# Patient Record
Sex: FEMALE | Race: WHITE
[De-identification: names, ages, dates, MRNs, and addresses within clinical notes are randomized per-mention and may not be internally consistent; named-entity substitution may affect disease eponyms.]

---

## 2021-09-24 ENCOUNTER — TRANSCRIPTION ENCOUNTER (OUTPATIENT)
Age: 66
End: 2021-09-24

## 2022-01-10 ENCOUNTER — TRANSCRIPTION ENCOUNTER (OUTPATIENT)
Age: 67
End: 2022-01-10

## 2022-10-10 ENCOUNTER — APPOINTMENT (OUTPATIENT)
Dept: OBGYN | Facility: CLINIC | Age: 67
End: 2022-10-10

## 2022-10-10 VITALS
TEMPERATURE: 98.2 F | SYSTOLIC BLOOD PRESSURE: 130 MMHG | BODY MASS INDEX: 31.49 KG/M2 | HEIGHT: 65 IN | DIASTOLIC BLOOD PRESSURE: 72 MMHG | WEIGHT: 189 LBS

## 2022-10-10 DIAGNOSIS — Z13.820 ENCOUNTER FOR SCREENING FOR OSTEOPOROSIS: ICD-10-CM

## 2022-10-10 DIAGNOSIS — Z00.00 ENCOUNTER FOR GENERAL ADULT MEDICAL EXAMINATION W/OUT ABNORMAL FINDINGS: ICD-10-CM

## 2022-10-10 DIAGNOSIS — Z87.891 PERSONAL HISTORY OF NICOTINE DEPENDENCE: ICD-10-CM

## 2022-10-10 DIAGNOSIS — Z12.31 ENCOUNTER FOR SCREENING MAMMOGRAM FOR MALIGNANT NEOPLASM OF BREAST: ICD-10-CM

## 2022-10-10 PROCEDURE — G0101: CPT

## 2022-10-10 NOTE — PHYSICAL EXAM
[Appropriately responsive] : appropriately responsive [Alert] : alert [No Acute Distress] : no acute distress [No Lymphadenopathy] : no lymphadenopathy [Regular Rate Rhythm] : regular rate rhythm [No Murmurs] : no murmurs [Clear to Auscultation B/L] : clear to auscultation bilaterally [Non-tender] : non-tender [Soft] : soft [Non-distended] : non-distended [No HSM] : No HSM [No Lesions] : no lesions [No Mass] : no mass [Oriented x3] : oriented x3 [Examination Of The Breasts] : a normal appearance [Normal] : normal [No Masses] : no breast masses were palpable

## 2022-10-10 NOTE — HISTORY OF PRESENT ILLNESS
[FreeTextEntry1] : 68 yo here for  for exam, last exam 3 yrs ago.NO abnormal paps , no  VB. some vag dryness occ hot flash. \par Dad had coloncancer at 80 from metastatic colon caner., mammos.\par Pt has never been genetically tested , [Currently Active] : currently active [Men] : men [Vaginal] : vaginal [No] : No

## 2022-10-11 LAB — HPV HIGH+LOW RISK DNA PNL CVX: NOT DETECTED

## 2022-10-13 LAB — CYTOLOGY CVX/VAG DOC THIN PREP: ABNORMAL

## 2022-10-27 ENCOUNTER — TRANSCRIPTION ENCOUNTER (OUTPATIENT)
Age: 67
End: 2022-10-27

## 2022-12-12 ENCOUNTER — APPOINTMENT (OUTPATIENT)
Dept: OBGYN | Facility: CLINIC | Age: 67
End: 2022-12-12

## 2022-12-12 DIAGNOSIS — M81.0 AGE-RELATED OSTEOPOROSIS W/OUT CURRENT PATHOLOGICAL FRACTURE: ICD-10-CM

## 2022-12-12 PROCEDURE — 99212 OFFICE O/P EST SF 10 MIN: CPT | Mod: 95

## 2022-12-12 RX ORDER — RALOXIFENE HYDROCHLORIDE 60 MG/1
60 TABLET, FILM COATED ORAL DAILY
Qty: 90 | Refills: 3 | Status: ACTIVE | COMMUNITY
Start: 2022-12-12 | End: 1900-01-01

## 2022-12-12 NOTE — HISTORY OF PRESENT ILLNESS
[Other Location: e.g. School (Enter Location, City,State)___] : at [unfilled], at the time of the visit. [Medical Office: ([facility])___] : at the medical office located in  [Spouse] : spouse [Verbal consent obtained from patient] : the patient, [unfilled] [FreeTextEntry1] : fhx-no\par [place]-2001\par Breakfast-oats\par lunch-soup sandwich\par dinner- greens, meat, \par caffine-1day, etoh-wine 3 glass smoke-40 yrs\par exercise- minimal\par had kidney stones in 20s.\par take vit D\par osteoporosisi-spine and osteopenia  hips.\par

## 2022-12-12 NOTE — DISCUSSION/SUMMARY
[FreeTextEntry1] : disc options of different meds\par evista\par biphoshenate\par  pt would like to try the evista\par She will increase her walking

## 2023-06-30 ENCOUNTER — INPATIENT (INPATIENT)
Facility: HOSPITAL | Age: 68
LOS: 5 days | Discharge: HOME | DRG: 868 | End: 2023-07-06
Attending: FAMILY MEDICINE | Admitting: FAMILY MEDICINE
Payer: MEDICARE

## 2023-06-30 VITALS — WEIGHT: 184.97 LBS | HEIGHT: 65 IN

## 2023-06-30 DIAGNOSIS — R55 SYNCOPE AND COLLAPSE: ICD-10-CM

## 2023-06-30 LAB
ALBUMIN SERPL ELPH-MCNC: 3.3 G/DL — SIGNIFICANT CHANGE UP (ref 3.3–5)
ALP SERPL-CCNC: 121 U/L — HIGH (ref 40–120)
ALT FLD-CCNC: 40 U/L — SIGNIFICANT CHANGE UP (ref 12–78)
ANION GAP SERPL CALC-SCNC: 4 MMOL/L — LOW (ref 5–17)
APPEARANCE UR: CLEAR — SIGNIFICANT CHANGE UP
AST SERPL-CCNC: 40 U/L — HIGH (ref 15–37)
BACTERIA # UR AUTO: ABNORMAL
BASOPHILS # BLD AUTO: 0 K/UL — SIGNIFICANT CHANGE UP (ref 0–0.2)
BASOPHILS NFR BLD AUTO: 0 % — SIGNIFICANT CHANGE UP (ref 0–2)
BILIRUB SERPL-MCNC: 1.2 MG/DL — SIGNIFICANT CHANGE UP (ref 0.2–1.2)
BILIRUB UR-MCNC: NEGATIVE — SIGNIFICANT CHANGE UP
BUN SERPL-MCNC: 19 MG/DL — SIGNIFICANT CHANGE UP (ref 7–23)
CALCIUM SERPL-MCNC: 9.4 MG/DL — SIGNIFICANT CHANGE UP (ref 8.5–10.1)
CHLORIDE SERPL-SCNC: 106 MMOL/L — SIGNIFICANT CHANGE UP (ref 96–108)
CO2 SERPL-SCNC: 25 MMOL/L — SIGNIFICANT CHANGE UP (ref 22–31)
COLOR SPEC: YELLOW — SIGNIFICANT CHANGE UP
CREAT SERPL-MCNC: 1.17 MG/DL — SIGNIFICANT CHANGE UP (ref 0.5–1.3)
DIFF PNL FLD: ABNORMAL
EGFR: 51 ML/MIN/1.73M2 — LOW
EOSINOPHIL # BLD AUTO: 0 K/UL — SIGNIFICANT CHANGE UP (ref 0–0.5)
EOSINOPHIL NFR BLD AUTO: 0 % — SIGNIFICANT CHANGE UP (ref 0–6)
EPI CELLS # UR: ABNORMAL
GLUCOSE SERPL-MCNC: 148 MG/DL — HIGH (ref 70–99)
GLUCOSE UR QL: NEGATIVE — SIGNIFICANT CHANGE UP
HCT VFR BLD CALC: 37.5 % — SIGNIFICANT CHANGE UP (ref 34.5–45)
HGB BLD-MCNC: 12.5 G/DL — SIGNIFICANT CHANGE UP (ref 11.5–15.5)
KETONES UR-MCNC: NEGATIVE — SIGNIFICANT CHANGE UP
LACTATE SERPL-SCNC: 1.4 MMOL/L — SIGNIFICANT CHANGE UP (ref 0.7–2)
LEUKOCYTE ESTERASE UR-ACNC: ABNORMAL
LYMPHOCYTES # BLD AUTO: 1.83 K/UL — SIGNIFICANT CHANGE UP (ref 1–3.3)
LYMPHOCYTES # BLD AUTO: 39 % — SIGNIFICANT CHANGE UP (ref 13–44)
MANUAL SMEAR VERIFICATION: SIGNIFICANT CHANGE UP
MCHC RBC-ENTMCNC: 29.8 PG — SIGNIFICANT CHANGE UP (ref 27–34)
MCHC RBC-ENTMCNC: 33.3 GM/DL — SIGNIFICANT CHANGE UP (ref 32–36)
MCV RBC AUTO: 89.3 FL — SIGNIFICANT CHANGE UP (ref 80–100)
MONOCYTES # BLD AUTO: 0.75 K/UL — SIGNIFICANT CHANGE UP (ref 0–0.9)
MONOCYTES NFR BLD AUTO: 16 % — HIGH (ref 2–14)
NEUTROPHILS # BLD AUTO: 1.97 K/UL — SIGNIFICANT CHANGE UP (ref 1.8–7.4)
NEUTROPHILS NFR BLD AUTO: 42 % — LOW (ref 43–77)
NITRITE UR-MCNC: NEGATIVE — SIGNIFICANT CHANGE UP
NRBC # BLD: 0 /100 — SIGNIFICANT CHANGE UP (ref 0–0)
NRBC # BLD: SIGNIFICANT CHANGE UP /100 WBCS (ref 0–0)
PH UR: 6.5 — SIGNIFICANT CHANGE UP (ref 5–8)
PLAT MORPH BLD: NORMAL — SIGNIFICANT CHANGE UP
PLATELET # BLD AUTO: 119 K/UL — LOW (ref 150–400)
PLATELET COUNT - ESTIMATE: ABNORMAL
POTASSIUM SERPL-MCNC: 4 MMOL/L — SIGNIFICANT CHANGE UP (ref 3.5–5.3)
POTASSIUM SERPL-SCNC: 4 MMOL/L — SIGNIFICANT CHANGE UP (ref 3.5–5.3)
PROT SERPL-MCNC: 7.5 GM/DL — SIGNIFICANT CHANGE UP (ref 6–8.3)
PROT UR-MCNC: 30 MG/DL
RAPID RVP RESULT: SIGNIFICANT CHANGE UP
RBC # BLD: 4.2 M/UL — SIGNIFICANT CHANGE UP (ref 3.8–5.2)
RBC # FLD: 15.6 % — HIGH (ref 10.3–14.5)
RBC BLD AUTO: NORMAL — SIGNIFICANT CHANGE UP
RBC CASTS # UR COMP ASSIST: ABNORMAL /HPF (ref 0–4)
SARS-COV-2 RNA SPEC QL NAA+PROBE: SIGNIFICANT CHANGE UP
SODIUM SERPL-SCNC: 135 MMOL/L — SIGNIFICANT CHANGE UP (ref 135–145)
SP GR SPEC: 1 — LOW (ref 1.01–1.02)
TROPONIN I, HIGH SENSITIVITY RESULT: 3.77 NG/L — SIGNIFICANT CHANGE UP
TROPONIN I, HIGH SENSITIVITY RESULT: 4.74 NG/L — SIGNIFICANT CHANGE UP
UROBILINOGEN FLD QL: 1
VARIANT LYMPHS # BLD: 3 % — SIGNIFICANT CHANGE UP (ref 0–6)
WBC # BLD: 4.68 K/UL — SIGNIFICANT CHANGE UP (ref 3.8–10.5)
WBC # FLD AUTO: 4.68 K/UL — SIGNIFICANT CHANGE UP (ref 3.8–10.5)
WBC UR QL: ABNORMAL /HPF (ref 0–5)

## 2023-06-30 PROCEDURE — 93005 ELECTROCARDIOGRAM TRACING: CPT

## 2023-06-30 PROCEDURE — 71275 CT ANGIOGRAPHY CHEST: CPT

## 2023-06-30 PROCEDURE — 86664 EPSTEIN-BARR NUCLEAR ANTIGEN: CPT

## 2023-06-30 PROCEDURE — 82607 VITAMIN B-12: CPT

## 2023-06-30 PROCEDURE — 85027 COMPLETE CBC AUTOMATED: CPT

## 2023-06-30 PROCEDURE — 85610 PROTHROMBIN TIME: CPT

## 2023-06-30 PROCEDURE — 86803 HEPATITIS C AB TEST: CPT

## 2023-06-30 PROCEDURE — 86308 HETEROPHILE ANTIBODY SCREEN: CPT

## 2023-06-30 PROCEDURE — 86645 CMV ANTIBODY IGM: CPT

## 2023-06-30 PROCEDURE — 85025 COMPLETE CBC W/AUTO DIFF WBC: CPT

## 2023-06-30 PROCEDURE — 87207 SMEAR SPECIAL STAIN: CPT

## 2023-06-30 PROCEDURE — 74177 CT ABD & PELVIS W/CONTRAST: CPT | Mod: 26,MA

## 2023-06-30 PROCEDURE — 36415 COLL VENOUS BLD VENIPUNCTURE: CPT

## 2023-06-30 PROCEDURE — 93306 TTE W/DOPPLER COMPLETE: CPT

## 2023-06-30 PROCEDURE — 85730 THROMBOPLASTIN TIME PARTIAL: CPT

## 2023-06-30 PROCEDURE — 80061 LIPID PANEL: CPT

## 2023-06-30 PROCEDURE — 86663 EPSTEIN-BARR ANTIBODY: CPT

## 2023-06-30 PROCEDURE — 86757 RICKETTSIA ANTIBODY: CPT

## 2023-06-30 PROCEDURE — 82746 ASSAY OF FOLIC ACID SERUM: CPT

## 2023-06-30 PROCEDURE — 82728 ASSAY OF FERRITIN: CPT

## 2023-06-30 PROCEDURE — 83540 ASSAY OF IRON: CPT

## 2023-06-30 PROCEDURE — 87468 ANAPLSMA PHGCYTOPHLM AMP PRB: CPT

## 2023-06-30 PROCEDURE — 93010 ELECTROCARDIOGRAM REPORT: CPT

## 2023-06-30 PROCEDURE — 85384 FIBRINOGEN ACTIVITY: CPT

## 2023-06-30 PROCEDURE — 71045 X-RAY EXAM CHEST 1 VIEW: CPT | Mod: 26

## 2023-06-30 PROCEDURE — 87798 DETECT AGENT NOS DNA AMP: CPT

## 2023-06-30 PROCEDURE — 85652 RBC SED RATE AUTOMATED: CPT

## 2023-06-30 PROCEDURE — 86617 LYME DISEASE ANTIBODY: CPT

## 2023-06-30 PROCEDURE — 86665 EPSTEIN-BARR CAPSID VCA: CPT

## 2023-06-30 PROCEDURE — 76700 US EXAM ABDOM COMPLETE: CPT

## 2023-06-30 PROCEDURE — 87040 BLOOD CULTURE FOR BACTERIA: CPT

## 2023-06-30 PROCEDURE — 80048 BASIC METABOLIC PNL TOTAL CA: CPT

## 2023-06-30 PROCEDURE — 87484 EHRLICHA CHAFFEENSIS AMP PRB: CPT

## 2023-06-30 PROCEDURE — 86618 LYME DISEASE ANTIBODY: CPT

## 2023-06-30 PROCEDURE — 86753 PROTOZOA ANTIBODY NOS: CPT

## 2023-06-30 PROCEDURE — 83605 ASSAY OF LACTIC ACID: CPT

## 2023-06-30 PROCEDURE — 83550 IRON BINDING TEST: CPT

## 2023-06-30 PROCEDURE — 84484 ASSAY OF TROPONIN QUANT: CPT

## 2023-06-30 PROCEDURE — 80053 COMPREHEN METABOLIC PANEL: CPT

## 2023-06-30 PROCEDURE — 86140 C-REACTIVE PROTEIN: CPT

## 2023-06-30 PROCEDURE — 86789 WEST NILE VIRUS ANTIBODY: CPT

## 2023-06-30 PROCEDURE — 83036 HEMOGLOBIN GLYCOSYLATED A1C: CPT

## 2023-06-30 PROCEDURE — 85379 FIBRIN DEGRADATION QUANT: CPT

## 2023-06-30 PROCEDURE — 99285 EMERGENCY DEPT VISIT HI MDM: CPT

## 2023-06-30 PROCEDURE — 83615 LACTATE (LD) (LDH) ENZYME: CPT

## 2023-06-30 PROCEDURE — 99222 1ST HOSP IP/OBS MODERATE 55: CPT

## 2023-06-30 PROCEDURE — 86644 CMV ANTIBODY: CPT

## 2023-06-30 PROCEDURE — 86666 EHRLICHIA ANTIBODY: CPT

## 2023-06-30 PROCEDURE — 86788 WEST NILE VIRUS AB IGM: CPT

## 2023-06-30 RX ORDER — SODIUM CHLORIDE 9 MG/ML
1000 INJECTION INTRAMUSCULAR; INTRAVENOUS; SUBCUTANEOUS ONCE
Refills: 0 | Status: COMPLETED | OUTPATIENT
Start: 2023-06-30 | End: 2023-06-30

## 2023-06-30 RX ORDER — RALOXIFENE HYDROCHLORIDE 60 MG/1
1 TABLET, COATED ORAL
Refills: 0 | DISCHARGE

## 2023-06-30 RX ORDER — TRAZODONE HCL 50 MG
0.5 TABLET ORAL
Refills: 0 | DISCHARGE

## 2023-06-30 RX ORDER — EZETIMIBE 10 MG/1
1 TABLET ORAL
Refills: 0 | DISCHARGE

## 2023-06-30 RX ORDER — ATORVASTATIN CALCIUM 80 MG/1
1 TABLET, FILM COATED ORAL
Refills: 0 | DISCHARGE

## 2023-06-30 RX ORDER — METOPROLOL TARTRATE 50 MG
1 TABLET ORAL
Refills: 0 | DISCHARGE

## 2023-06-30 RX ORDER — ACETAMINOPHEN 500 MG
650 TABLET ORAL ONCE
Refills: 0 | Status: COMPLETED | OUTPATIENT
Start: 2023-06-30 | End: 2023-07-01

## 2023-06-30 RX ADMIN — SODIUM CHLORIDE 1000 MILLILITER(S): 9 INJECTION INTRAMUSCULAR; INTRAVENOUS; SUBCUTANEOUS at 19:49

## 2023-06-30 RX ADMIN — SODIUM CHLORIDE 1000 MILLILITER(S): 9 INJECTION INTRAMUSCULAR; INTRAVENOUS; SUBCUTANEOUS at 17:46

## 2023-06-30 NOTE — ED STATDOCS - PHYSICAL EXAMINATION
Constitutional: NAD   Eyes: PERRLA  Head: Normocephalic   Mouth: MMM  Cardiac: regular rate   Resp: Lungs CTAB  GI: Abd s/nt/nd, no rebound or guarding.  Neuro: awake, alert, moving all extremities  Skin: No rashes

## 2023-06-30 NOTE — ED STATDOCS - CLINICAL SUMMARY MEDICAL DECISION MAKING FREE TEXT BOX
66 y/o female with near syncope, left lower abdominal pain, and weakness. Will obtain blood work, CT scan, UA, and r/o infectious vs. electrolyte abnormality.

## 2023-06-30 NOTE — PATIENT PROFILE ADULT - DO YOU FEEL LIKE HURTING YOURSELF OR OTHERS?
Clinic hours for Dr. Min:  Monday 7 am - 5 pm  Tuesday 7 am - 5 pm  Wednesday 7 am - 5 pm  Thursday 7 am - 5 pm  Friday  7 am - 4 pm    If you need a refill on your prescription please call your pharmacy and let them know. Please be proactive and call before your medication runs out. The pharmacy will then contact us for the refill. Please allow 24-48 hours for the refill to be processed.     If your physician has ordered additional laboratory or radiology testing as part of your ongoing plan of care, please allow 7-10 business days from the day of your lab draw or test for the results to be sent and reviewed by your provider. If your results are critical and require more immediate intervention, you will be contacted sooner. Your results will be conveyed to you via a phone call or letter.    You may be receiving a patient satisfaction survey in the mail. Please take the time to complete, as your feedback is very important to us. We strive to make your experience exceptional and your comments help us with that goal. We look forward to hearing from you.       no

## 2023-06-30 NOTE — ED STATDOCS - PROGRESS NOTE DETAILS
patient seen and evaluated.  Reviewed all results with patient, given hepatosplenomegaly, suspect patient possibly has a viral syndrome but given her dizzy symptoms and her being on the monitor is was seen that patient had an episode of 5 beats of vtach.  Given her symptoms, it is concerning that her dizziness is possibly related.  Patient agreeable to admission for cardiac monitoring.  Case endorsed to Dr. Aleksey Abdul PA-C

## 2023-06-30 NOTE — ED ADULT NURSE NOTE - OBJECTIVE STATEMENT
Patient is a 67y old female, alert and oriented X3, presenting to the ER with multiple medical complaints. Patient's reports "2 weeks ago she started feeling dizzy, fatigue, having no ambition to do anything, lack of appetite and a headache."  reports "she began to feel better but on Monday or Tuesday she started not feeling well again. On Wednesday she went to the cardiologist where she had an echo done which was good."   Patient denies CP, SOB, vomiting, diarrhea, visual changes at this time.   20GA IV placed in the left wrist, blood collected and sent to lab. EKG completed and cardiac monitor in place.

## 2023-06-30 NOTE — PATIENT PROFILE ADULT - FALL HARM RISK - RISK INTERVENTIONS

## 2023-06-30 NOTE — ED STATDOCS - OBJECTIVE STATEMENT
66 y/o female with PMHx of HTN, HLD presents to the ED c/o weakness, fatigue, dizziness/near-syncope, diaphoresis, and headache x4 days, also with left abdominal pain x1 month. +Allergic to Augmentin. Denies chest pain, SOB, cough, sore throat, runny nose, dysuria.

## 2023-06-30 NOTE — ED STATDOCS - NS ED ROS FT
Constitutional: +Sweats, weakness, fatigue  Eyes: No visual changes  HEENT: No throat pain  CV: No chest pain  Resp: No SOB no cough  GI: +Abdominal pain  : No dysuria  MSK: No musculoskeletal pain  Skin: No rash  Neuro: +Headache, +near syncope

## 2023-06-30 NOTE — ED ADULT TRIAGE NOTE - CHIEF COMPLAINT QUOTE
Pt presents to ED c/o "left abdominal pain when taking a deep breath, headache, dizziness, cold sweats, weakness and decreased appetite for the past 4 days. 2 days ago I also had a fever." As per - pt has been pale x4days. Denies hematuria, dark/ bloody stools.

## 2023-07-01 DIAGNOSIS — R42 DIZZINESS AND GIDDINESS: ICD-10-CM

## 2023-07-01 LAB
A1C WITH ESTIMATED AVERAGE GLUCOSE RESULT: 5.7 % — HIGH (ref 4–5.6)
ANION GAP SERPL CALC-SCNC: 6 MMOL/L — SIGNIFICANT CHANGE UP (ref 5–17)
BUN SERPL-MCNC: 18 MG/DL — SIGNIFICANT CHANGE UP (ref 7–23)
CALCIUM SERPL-MCNC: 8.5 MG/DL — SIGNIFICANT CHANGE UP (ref 8.5–10.1)
CHLORIDE SERPL-SCNC: 112 MMOL/L — HIGH (ref 96–108)
CHOLEST SERPL-MCNC: 78 MG/DL — SIGNIFICANT CHANGE UP
CO2 SERPL-SCNC: 22 MMOL/L — SIGNIFICANT CHANGE UP (ref 22–31)
CREAT SERPL-MCNC: 0.97 MG/DL — SIGNIFICANT CHANGE UP (ref 0.5–1.3)
EGFR: 64 ML/MIN/1.73M2 — SIGNIFICANT CHANGE UP
ESTIMATED AVERAGE GLUCOSE: 117 MG/DL — HIGH (ref 68–114)
GLUCOSE SERPL-MCNC: 115 MG/DL — HIGH (ref 70–99)
HCV AB S/CO SERPL IA: 0.47 S/CO — SIGNIFICANT CHANGE UP (ref 0–0.99)
HCV AB SERPL-IMP: SIGNIFICANT CHANGE UP
HDLC SERPL-MCNC: 9 MG/DL — LOW
HETEROPH AB TITR SER AGGL: NEGATIVE — SIGNIFICANT CHANGE UP
LIPID PNL WITH DIRECT LDL SERPL: 28 MG/DL — SIGNIFICANT CHANGE UP
NON HDL CHOLESTEROL: 69 MG/DL — SIGNIFICANT CHANGE UP
POTASSIUM SERPL-MCNC: 3.7 MMOL/L — SIGNIFICANT CHANGE UP (ref 3.5–5.3)
POTASSIUM SERPL-SCNC: 3.7 MMOL/L — SIGNIFICANT CHANGE UP (ref 3.5–5.3)
SODIUM SERPL-SCNC: 140 MMOL/L — SIGNIFICANT CHANGE UP (ref 135–145)
TRIGL SERPL-MCNC: 202 MG/DL — HIGH
TROPONIN I, HIGH SENSITIVITY RESULT: 7.31 NG/L — SIGNIFICANT CHANGE UP

## 2023-07-01 PROCEDURE — 93010 ELECTROCARDIOGRAM REPORT: CPT

## 2023-07-01 PROCEDURE — 99223 1ST HOSP IP/OBS HIGH 75: CPT

## 2023-07-01 PROCEDURE — 99233 SBSQ HOSP IP/OBS HIGH 50: CPT

## 2023-07-01 PROCEDURE — 93306 TTE W/DOPPLER COMPLETE: CPT | Mod: 26

## 2023-07-01 RX ORDER — RALOXIFENE HYDROCHLORIDE 60 MG/1
60 TABLET, COATED ORAL DAILY
Refills: 0 | Status: DISCONTINUED | OUTPATIENT
Start: 2023-07-01 | End: 2023-07-06

## 2023-07-01 RX ORDER — CEFTRIAXONE 500 MG/1
1000 INJECTION, POWDER, FOR SOLUTION INTRAMUSCULAR; INTRAVENOUS ONCE
Refills: 0 | Status: COMPLETED | OUTPATIENT
Start: 2023-07-01 | End: 2023-07-01

## 2023-07-01 RX ORDER — CEFTRIAXONE 500 MG/1
1000 INJECTION, POWDER, FOR SOLUTION INTRAMUSCULAR; INTRAVENOUS EVERY 24 HOURS
Refills: 0 | Status: DISCONTINUED | OUTPATIENT
Start: 2023-07-02 | End: 2023-07-03

## 2023-07-01 RX ORDER — CEFTRIAXONE 500 MG/1
INJECTION, POWDER, FOR SOLUTION INTRAMUSCULAR; INTRAVENOUS
Refills: 0 | Status: DISCONTINUED | OUTPATIENT
Start: 2023-07-01 | End: 2023-07-03

## 2023-07-01 RX ORDER — ATORVASTATIN CALCIUM 80 MG/1
80 TABLET, FILM COATED ORAL AT BEDTIME
Refills: 0 | Status: DISCONTINUED | OUTPATIENT
Start: 2023-07-01 | End: 2023-07-06

## 2023-07-01 RX ORDER — SODIUM CHLORIDE 9 MG/ML
1000 INJECTION INTRAMUSCULAR; INTRAVENOUS; SUBCUTANEOUS
Refills: 0 | Status: DISCONTINUED | OUTPATIENT
Start: 2023-07-01 | End: 2023-07-06

## 2023-07-01 RX ORDER — TRAZODONE HCL 50 MG
25 TABLET ORAL AT BEDTIME
Refills: 0 | Status: DISCONTINUED | OUTPATIENT
Start: 2023-07-01 | End: 2023-07-06

## 2023-07-01 RX ORDER — METOPROLOL TARTRATE 50 MG
50 TABLET ORAL DAILY
Refills: 0 | Status: DISCONTINUED | OUTPATIENT
Start: 2023-07-01 | End: 2023-07-02

## 2023-07-01 RX ADMIN — ATORVASTATIN CALCIUM 80 MILLIGRAM(S): 80 TABLET, FILM COATED ORAL at 21:35

## 2023-07-01 RX ADMIN — Medication 650 MILLIGRAM(S): at 15:53

## 2023-07-01 RX ADMIN — Medication 25 MILLIGRAM(S): at 21:35

## 2023-07-01 RX ADMIN — SODIUM CHLORIDE 75 MILLILITER(S): 9 INJECTION INTRAMUSCULAR; INTRAVENOUS; SUBCUTANEOUS at 22:37

## 2023-07-01 RX ADMIN — CEFTRIAXONE 1000 MILLIGRAM(S): 500 INJECTION, POWDER, FOR SOLUTION INTRAMUSCULAR; INTRAVENOUS at 16:27

## 2023-07-01 RX ADMIN — RALOXIFENE HYDROCHLORIDE 60 MILLIGRAM(S): 60 TABLET, COATED ORAL at 10:05

## 2023-07-01 NOTE — PROGRESS NOTE ADULT - ASSESSMENT
Pt is admitted w/    Pre-syncope and weakness, chills  DDX Arrythmia, viral syndrome  Vtach  5 beat on ED telemetry  Abd pain  Hepatosplenomegaly and thrombocytopenia  Constipation, pt reports 2 bms/week    - adm to telemetry   - s/p 2 L NS in the ED  - troponin is neg  - RVP and Mono is neg  - f/u blood cx, u cx  - check orthostatics  - cardiology consult  - out pt f/u with Dr. Starks and Dr. Avila for Hepatosplenomegaly, thrombocytopenia and outpt colonoscopy  - DVT proph:   ambulation  - Full Code 68 y/o female with PMHx of HTN, HLD who presented to [place] ED c/o weakness, fatigue, dizziness, near-syncope.   Pt also had a temperature of 99. F at home with chills,  diaphoresis, headache for four days.    Pre-syncope and weakness, chills  DDX Arrythmia, viral syndrome  Vtach  5 beat on ED telemetry  Abd pain  Hepatosplenomegaly and thrombocytopenia  Constipation, pt reports 2 bms/week    Near Syncope  - echo EF >65%    -continue telemetry   - s/p 2 L NS in the ED  - troponin is neg  - RVP and Mono is neg  - f/u blood cx, u cx  - check orthostatics  - cardiology consult  - out pt f/u with Dr. Starks and Dr. Avila for Hepatosplenomegaly, thrombocytopenia and outpt colonoscopy  - DVT proph:   ambulation  - Full Code    Orthostatic hypotension  -IVF: NS at 75 ml/hr

## 2023-07-01 NOTE — CONSULT NOTE ADULT - SUBJECTIVE AND OBJECTIVE BOX
CHIEF COMPLAINT:    HPI:  Pt is a pleasant 66 y/o female with PMHx of HTN, HLD who presented to [place] ED c/o weakness, fatigue, dizziness, near-syncope.   Pt also had a temperature of 99. F at home with chills,  diaphoresis, headache for four days.    She reported left sided abdominal pain  for nearly a  month.  She reports nausea and denies vomiting.    Pt denies  URI or UTI symptoms,  no chest pain, no SOB, no edema,  pt reports constipation, pt reports 2 bms/week for years.        Pt reports she saw her cardiologist for some of her symptoms including weakness last month and had an echo which was reportedly  normal.  She states she had a neg nuclear stress test , two years ago.   She has never had a colonoscopy as she reports she is too occupied caring for  and has not had the time.   Pt is allergic to Augmentin. (01 Jul 2023 00:33)    Admitted for presyncope & 5 beats of NSVT.  Reviewed tele - brief episode, otherwise completely normal rhythm  w/ no PVCs or NSVT overnight.  Reviewed history - no LOC. no palpitations, chest pain, dyspnea.  Had echo w/ cardiologist recently she reports normal study.  Chem stress test planned for fatigue symptoms described above.  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    She reports  2  glasses of wine/week.    No tob/drug use. (01 Jul 2023 00:33)      FAMILY  HISTORY:  FAMILY HISTORY:  FHx: dementia (Mother)    Family history of colon cancer (Father)        MEDICATIONS:  OUTPATIENT:  Home Medications:  atorvastatin 80 mg oral tablet: 1 tab(s) orally once a day (30 Jun 2023 19:43)  ezetimibe 10 mg oral tablet: 1 tab(s) orally once a day (30 Jun 2023 19:43)  metoprolol succinate 50 mg oral tablet, extended release: 1 tab(s) orally once a day (30 Jun 2023 19:43)  raloxifene 60 mg oral tablet: 1 tab(s) orally once a day (30 Jun 2023 19:43)  traZODone 50 mg oral tablet: 0.5 tab(s) orally once a day (at bedtime) (30 Jun 2023 19:43)      INPATIENT:  MEDICATIONS  (STANDING):  atorvastatin 80 milliGRAM(s) Oral at bedtime  metoprolol succinate ER 50 milliGRAM(s) Oral daily  raloxifene 60 milliGRAM(s) Oral daily  traZODone 25 milliGRAM(s) Oral at bedtime    MEDICATIONS  (PRN):  acetaminophen     Tablet .. 650 milliGRAM(s) Oral once PRN Temp greater or equal to 38C (100.4F), Mild Pain (1 - 3)    MEDICATIONS  (PRN):  acetaminophen     Tablet .. 650 milliGRAM(s) Oral once PRN Temp greater or equal to 38C (100.4F), Mild Pain (1 - 3)      REVIEW OF SYSTEMS:  ===============================  ===============================  CONSTITUTIONAL: No weakness, fevers or chills  EYES/ENT: No visual changes;  No vertigo or throat pain   NECK: No pain or stiffness  RESPIRATORY: No cough, wheezing, hemoptysis; No shortness of breath  CARDIOVASCULAR: No chest pain or palpitations  GASTROINTESTINAL: No abdominal or epigastric pain. No nausea, vomiting, or hematemesis;   No diarrhea or constipation. No melena or hematochezia.  GENITOURINARY: No dysuria, frequency or hematuria  NEUROLOGICAL: No numbness or weakness  SKIN: No itching, burning, rashes, or lesions   All other review of systems is negative unless indicated above    Vital Signs Last 24 Hrs  T(C): 37 (01 Jul 2023 05:58), Max: 37 (01 Jul 2023 05:58)  T(F): 98.6 (01 Jul 2023 05:58), Max: 98.6 (01 Jul 2023 05:58)  HR: 91 (30 Jun 2023 23:42) (74 - 154)  BP: 126/63 (30 Jun 2023 23:42) (94/60 - 138/59)  BP(mean): 72 (30 Jun 2023 14:32) (72 - 72)  RR: 18 (30 Jun 2023 22:08) (16 - 18)  SpO2: 95% (01 Jul 2023 05:58) (93% - 100%)    Parameters below as of 01 Jul 2023 05:58  Patient On (Oxygen Delivery Method): room air        I&O's Summary      I&O's Detail      PHYSICAL EXAM:    Constitutional: NAD, awake and alert, well-developed  HEENT: PERR, EOMI,  No oral cyananosis.  Neck:  supple,  No JVD  Respiratory: Breath sounds are clear bilaterally, No wheezing, rales or rhonchi  Cardiovascular: S1 and S2, regular rate and rhythm, no Murmurs, gallops or rubs  Gastrointestinal: Bowel Sounds present, soft, nontender.   Extremities: No peripheral edema. No clubbing or cyanosis.  Vascular: 2+ peripheral pulses  Neurological: A/O x 3, no focal deficits  Musculoskeletal: no calf tenderness.  Skin: No rashes.    ===============================  ===============================  LABS:                         12.5   4.68  )-----------( 119      ( 30 Jun 2023 15:34 )             37.5     01 Jul 2023 06:57    140    |  112    |  18     ----------------------------<  115    3.7     |  22     |  0.97   30 Jun 2023 15:34    135    |  106    |  19     ----------------------------<  148    4.0     |  25     |  1.17     Ca    8.5        01 Jul 2023 06:57  Ca    9.4        30 Jun 2023 15:34    TPro  7.5    /  Alb  3.3    /  TBili  1.2    /  DBili  x      /  AST  40     /  ALT  40     /  AlkPhos  121    30 Jun 2023 15:34        THYROID STUDIES:    ===============================  ===============================  CARDIAC BIOMARKERS:  -------  -BNP VALUES:    -------  -TROPONIN VALUES:   Troponin I, High Sensitivity Result: 7.31 ng/L (07-01-23 @ 06:57)  Troponin I, High Sensitivity Result: 4.74 ng/L (06-30-23 @ 20:11)  Troponin I, High Sensitivity Result: 3.77 ng/L (06-30-23 @ 15:34)      ===============================  ===============================  EKG: NSR no ischemic changes.

## 2023-07-01 NOTE — PROGRESS NOTE ADULT - SUBJECTIVE AND OBJECTIVE BOX
66 y/o female with PMHx of HTN, HLD who presented to [place] ED c/o weakness, fatigue, dizziness, near-syncope.   Pt also had a temperature of 99. F at home with chills,  diaphoresis, headache for four days.    She reported left sided abdominal pain  for nearly a  month.  She reports nausea and denies vomiting.    Pt denies  URI or UTI symptoms,  no chest pain, no SOB, no edema,  pt reports constipation, pt reports 2 bms/week for years.        Pt reports she saw her cardiologist for some of her symptoms including weakness last month and had an echo which was reportedly  normal.  She states she had a neg nuclear stress test , two years ago.   She has never had a colonoscopy as she reports she is too occupied caring for  and has not had the time.

## 2023-07-01 NOTE — H&P ADULT - NSICDXFAMILYHX_GEN_ALL_CORE_FT
FAMILY HISTORY:  Father  Still living? No  Family history of colon cancer, Age at diagnosis: 81-90    Mother  Still living? No  FHx: dementia, Age at diagnosis: 71-80

## 2023-07-01 NOTE — H&P ADULT - ASSESSMENT
Pt is admitted w/    Pre-syncope   Vtach  5 beat on ED telemetry  Abd pain  Hepatosplenomegaly    - adm to telemetry   - check orthoststics  - cardiology consult  - out pt f/u with Dr. Starks for Hepatosplenomegaly  - DVT proph:   ambulation  - Full Code Pt is admitted w/    Pre-syncope and weakness, chills  Vtach  5 beat on ED telemetry  Abd pain  Hepatosplenomegaly  Constipation, pt reports 2 bms/week    - adm to telemetry   - blood cx, u cx  - check orthostatics  - cardiology consult  - out pt f/u with Dr. Starks and Dr. Avila for Hepatosplenomegaly, and outpt colonoscopy  - DVT proph:   ambulation  - Full Code Pt is admitted w/    Pre-syncope and weakness, chills  DDX Arrythmia, viral syndrome  Vtach  5 beat on ED telemetry  Abd pain  Hepatosplenomegaly and thrombocytopenia  Constipation, pt reports 2 bms/week    - adm to telemetry   - s/p 2 L NS in the ED  - troponin is neg  - RVP and Mono is neg  - f/u blood cx, u cx  - check orthostatics  - cardiology consult  - out pt f/u with Dr. Starks and Dr. Avila for Hepatosplenomegaly, thrombocytopenia and outpt colonoscopy  - DVT proph:   ambulation  - Full Code

## 2023-07-01 NOTE — H&P ADULT - HISTORY OF PRESENT ILLNESS
Pt is a pleasant 66 y/o female with PMHx of HTN, HLD presents to the ED c/o weakness, fatigue, dizziness/near-syncope, diaphoresis, and headache x4 days, also with left abdominal pain x1 month. +Allergic to Augmentin. Denies chest pain, SOB, cough, sore throat, runny nose, dysuria. Pt is a pleasant 66 y/o female with PMHx of HTN, HLD who presented to [place] ED c/o weakness, fatigue, dizziness, near-syncope.   Pt also had a temperature of 99. F at home with chills,  diaphoresis, headache for four days.    She reported left sided abdominal pain  for nearly a  month.  She reports nausea and denies vomiting.    Pt denies  URI or UTI symptoms,  no chest pain, no SOB, no edema,  pt reports constipation, pt reports 2 bms/week for years.        Pt reports she saw her cardiologist for some of her symptoms including weakness last month and had an echo which was reportedly  normal.  She states she had a neg nuclear stress test , two years ago.   She has never had a colonoscopy as she reports she is too occupied caring for  and has not had the time.       Pt is allergic to Augmentin.

## 2023-07-01 NOTE — CONSULT NOTE ADULT - PROBLEM SELECTOR RECOMMENDATION 9
-Suspect dehydration despite borderline neg orthostatics - may have received fluids prior.  -Doubt arrhythmia is etiology - only one 4 beat NSVT run over 24 hrs.    -Await echo  -Cont. tele monitoring.  -Stress test as OP.

## 2023-07-01 NOTE — H&P ADULT - NSHPPHYSICALEXAM_GEN_ALL_CORE
ICU Vital Signs Last 24 Hrs  T(C): 36.8 (30 Jun 2023 23:42), Max: 36.9 (30 Jun 2023 14:32)  T(F): 98.2 (30 Jun 2023 23:42), Max: 98.5 (30 Jun 2023 14:32)  HR: 91 (30 Jun 2023 23:42) (74 - 154)  BP: 126/63 (30 Jun 2023 23:42) (94/60 - 138/59)  BP(mean): 72 (30 Jun 2023 14:32) (72 - 72)    RR: 18 (30 Jun 2023 22:08) (16 - 18)  SpO2: 99% (30 Jun 2023 23:42) (93% - 100%)    O2 Parameters below as of 30 Jun 2023 23:42  Patient On (Oxygen Delivery Method): room air

## 2023-07-02 LAB
CULTURE RESULTS: SIGNIFICANT CHANGE UP
SPECIMEN SOURCE: SIGNIFICANT CHANGE UP

## 2023-07-02 PROCEDURE — 99232 SBSQ HOSP IP/OBS MODERATE 35: CPT

## 2023-07-02 PROCEDURE — 99233 SBSQ HOSP IP/OBS HIGH 50: CPT

## 2023-07-02 RX ORDER — ACETAMINOPHEN 500 MG
650 TABLET ORAL EVERY 6 HOURS
Refills: 0 | Status: DISCONTINUED | OUTPATIENT
Start: 2023-07-02 | End: 2023-07-06

## 2023-07-02 RX ORDER — METOPROLOL TARTRATE 50 MG
25 TABLET ORAL DAILY
Refills: 0 | Status: DISCONTINUED | OUTPATIENT
Start: 2023-07-03 | End: 2023-07-06

## 2023-07-02 RX ADMIN — Medication 25 MILLIGRAM(S): at 21:51

## 2023-07-02 RX ADMIN — ATORVASTATIN CALCIUM 80 MILLIGRAM(S): 80 TABLET, FILM COATED ORAL at 21:51

## 2023-07-02 RX ADMIN — RALOXIFENE HYDROCHLORIDE 60 MILLIGRAM(S): 60 TABLET, COATED ORAL at 10:11

## 2023-07-02 RX ADMIN — CEFTRIAXONE 1000 MILLIGRAM(S): 500 INJECTION, POWDER, FOR SOLUTION INTRAMUSCULAR; INTRAVENOUS at 16:38

## 2023-07-02 NOTE — PROGRESS NOTE ADULT - ASSESSMENT
66 y/o female with PMHx of HTN, HLD who presented to [facility] ED c/o weakness, fatigue, dizziness, near-syncope.      # Presyncope  - Likely due to viral syndrome and/or dehydration  - Repeat orthostatics  - Echo reviewed  - SP Fluid bolus in ED  - Cardiology consult appreciated  - Stable on monitor    # HSM  - FU with Dr. Guerra/[place] for further evaluation  - Check Mono spot    # Fever  - No obvious source, viral syndrome vs atelectasis  - Monitor    # DVT prophylaxis  - Ambulation    # Disposition: Full code

## 2023-07-02 NOTE — PROGRESS NOTE ADULT - SUBJECTIVE AND OBJECTIVE BOX
HOSPITALIST ATTENDING PROGRESS NOTE    Chart and meds reviewed.  Patient seen and examined.    CC: Dizziness    Subjective: Patient feels better today, no chest pain, sob or dizziness. + Fever overnight 100.8    All other systems reviewed and found to be negative with the exception of what has been described above.    MEDICATIONS  (STANDING):  atorvastatin 80 milliGRAM(s) Oral at bedtime  cefTRIAXone Injectable. 1000 milliGRAM(s) IV Push every 24 hours  cefTRIAXone Injectable.      metoprolol succinate ER 50 milliGRAM(s) Oral daily  raloxifene 60 milliGRAM(s) Oral daily  sodium chloride 0.9%. 1000 milliLiter(s) (75 mL/Hr) IV Continuous <Continuous>  traZODone 25 milliGRAM(s) Oral at bedtime    MEDICATIONS  (PRN):      VITALS:  T(F): 98.8 (07-02-23 @ 08:56), Max: 100.8 (07-01-23 @ 16:02)  HR: 85 (07-02-23 @ 08:56) (77 - 86)  BP: 108/53 (07-02-23 @ 08:56) (101/79 - 108/53)  RR: 18 (07-02-23 @ 08:56) (18 - 18)  SpO2: 90% (07-02-23 @ 08:56) (90% - 100%)      PHYSICAL EXAM:  GEN: NAD  HEENT:  pupils equal and reactive, EOMI, no oropharyngeal lesions, erythema, exudates, oral thrush  NECK:   supple, no carotid bruits, no palpable lymph nodes, no thyromegaly  CV:  +S1, +S2, regular, no murmurs or rubs  RESP:   lungs clear to auscultation bilaterally, no wheezing, rales, rhonchi, good air entry bilaterally  BREAST:  not examined  GI:  abdomen soft, non-tender, non-distended, normal BS, no bruits, no abdominal masses, no palpable masses  RECTAL:  not examined  :  not examined  MSK:   normal muscle tone, no atrophy, no rigidity, no contractions  EXT:  no clubbing, no cyanosis, no edema, no calf pain, swelling or erythema  VASCULAR:  pulses equal and symmetric in the upper and lower extremities  NEURO:  AAOX3, no focal neurological deficits, follows all commands, able to move extremities spontaneously  SKIN:  no ulcers, lesions or rashes    LABS:                            12.5   4.68  )-----------( 119      ( 30 Jun 2023 15:34 )             37.5     07-01    140  |  112<H>  |  18  ----------------------------<  115<H>  3.7   |  22  |  0.97    Ca    8.5      01 Jul 2023 06:57    TPro  7.5  /  Alb  3.3  /  TBili  1.2  /  DBili  x   /  AST  40<H>  /  ALT  40  /  AlkPhos  121<H>  06-30        LIVER FUNCTIONS - ( 30 Jun 2023 15:34 )  Alb: 3.3 g/dL / Pro: 7.5 gm/dL / ALK PHOS: 121 U/L / ALT: 40 U/L / AST: 40 U/L / GGT: x             Urinalysis Basic - ( 01 Jul 2023 06:57 )  Color: x / Appearance: x / SG: x / pH: x  Gluc: 115 mg/dL / Ketone: x  / Bili: x / Urobili: x   Blood: x / Protein: x / Nitrite: x   Leuk Esterase: x / RBC: x / WBC x   Sq Epi: x / Non Sq Epi: x / Bacteria: x       CT Abdomen and Pelvis w/ IV Cont (06.30.23 @ 16:07) >    IMPRESSION:  No acute CT findings. No bowel obstruction or inflammation.    Scattered sigmoid diverticulosis without evidence of acute   diverticulitis. Normal appendix.    Hepatosplenomegaly.

## 2023-07-02 NOTE — PROGRESS NOTE ADULT - PROBLEM SELECTOR PLAN 1
Problem: Dizziness.   ·  Recommendation: -Likely due to dehydration and borderline orthostatic BP changes, ? trazadone.  Baseline BP runs low normal.  Recommend pt maintain adequate hydration.  Compression stockings.  Change positions slowly.  Check orthostatic BP today.  Echo shows normal LVF, EF 65%, mild-mod TR  -Doubt arrhythmia is etiology - only one 4 beat NSVT run over 24 hrs.  No further events noted    -Stress test as OP. -Likely due to dehydration and borderline orthostatic BP changes, ? trazadone.    -Reduce metoprolol succ from 50 to 25    -Baseline BP runs low normal.  Recommend pt maintain adequate hydration.  Compression stockings.  Change positions slowly.  Check orthostatic BP today.  Echo shows normal LVF, EF 65%, mild-mod TR  -Doubt arrhythmia is etiology - only one 4 beat NSVT run over 24 hrs.  No further events noted    -Stress test as OP.    -Will sign off please call if ques.

## 2023-07-02 NOTE — PROGRESS NOTE ADULT - SUBJECTIVE AND OBJECTIVE BOX
CHIEF COMPLAINT:    HPI:  Pt is a pleasant 68 y/o female with PMHx of HTN, HLD who presented to [place] ED c/o weakness, fatigue, dizziness, near-syncope.   Pt also had a temperature of 99. F at home with chills,  diaphoresis, headache for four days.    She reported left sided abdominal pain  for nearly a  month.  She reports nausea and denies vomiting.    Pt denies  URI or UTI symptoms,  no chest pain, no SOB, no edema,  pt reports constipation, pt reports 2 bms/week for years.        Pt reports she saw her cardiologist for some of her symptoms including weakness last month and had an echo which was reportedly  normal.  She states she had a neg nuclear stress test , two years ago.   She has never had a colonoscopy as she reports she is too occupied caring for  and has not had the time.   Pt is allergic to Augmentin. (01 Jul 2023 00:33)    Admitted for presyncope & 5 beats of NSVT.  Reviewed tele - brief episode, otherwise completely normal rhythm  w/ no PVCs or NSVT overnight.  Reviewed history - no LOC. no palpitations, chest pain, dyspnea.  Had echo w/ cardiologist recently she reports normal study.  Chem stress test planned for fatigue symptoms described above.  No cardiac history sees cardio for HTN HLP & "yearly checkup"    7/2/23: Pt denies cp, sob, palpitation, dizziness.  Tele: [facility]    PAST MEDICAL AND SURGICAL HISTORY:  PAST MEDICAL & SURGICAL HISTORY:  No significant past surgical history      ALLERGIES:  Allergies    No Known Allergies    Intolerances    Augmentin (Vomiting; Nausea)      MEDICATIONS:  OUTPATIENT:  Home Medications:  atorvastatin 80 mg oral tablet: 1 tab(s) orally once a day (30 Jun 2023 19:43)  ezetimibe 10 mg oral tablet: 1 tab(s) orally once a day (30 Jun 2023 19:43)  metoprolol succinate 50 mg oral tablet, extended release: 1 tab(s) orally once a day (30 Jun 2023 19:43)  raloxifene 60 mg oral tablet: 1 tab(s) orally once a day (30 Jun 2023 19:43)  traZODone 50 mg oral tablet: 0.5 tab(s) orally once a day (at bedtime) (30 Jun 2023 19:43)      MEDICATIONS  (STANDING):  atorvastatin 80 milliGRAM(s) Oral at bedtime  cefTRIAXone Injectable. 1000 milliGRAM(s) IV Push every 24 hours  cefTRIAXone Injectable.      metoprolol succinate ER 50 milliGRAM(s) Oral daily  raloxifene 60 milliGRAM(s) Oral daily  sodium chloride 0.9%. 1000 milliLiter(s) (75 mL/Hr) IV Continuous <Continuous>  traZODone 25 milliGRAM(s) Oral at bedtime    MEDICATIONS  (PRN):    Vital Signs Last 24 Hrs  T(C): 37.1 (07-02-23 @ 08:56), Max: 38.2 (07-01-23 @ 16:02)  T(F): 98.8 (07-02-23 @ 08:56), Max: 100.8 (07-01-23 @ 16:02)  HR: 85 (07-02-23 @ 08:56) (77 - 86)  BP: 108/53 (07-02-23 @ 08:56) (101/79 - 108/53)  BP(mean): 76 (07-01-23 @ 22:45) (76 - 88)  RR: 18 (07-02-23 @ 08:56) (18 - 18)  SpO2: 90% (07-02-23 @ 08:56) (90% - 100%)    Orthostatic VS  07-01-23 @ 08:48  Lying BP: 93/62 HR: 86  Sitting BP: 101/63 HR: 88  Standing BP: 78/51 HR: 89        I&O's Summary      I&O's Detail      PHYSICAL EXAM:    Constitutional: NAD, awake and alert, well-developed  HEENT: PERR, EOMI,  No oral cyananosis.  Neck:  supple,  No JVD  Respiratory: Breath sounds are clear bilaterally, No wheezing, rales or rhonchi  Cardiovascular: S1 and S2, regular rate and rhythm, no Murmurs, gallops or rubs  Gastrointestinal: Bowel Sounds present, soft, nontender.   Extremities: No peripheral edema. No clubbing or cyanosis.  Vascular: 2+ peripheral pulses  Neurological: A/O x 3, no focal deficits  Musculoskeletal: no calf tenderness.  Skin: No rashes.    ===============================  ===============================  LABS:                                      12.5   4.68  )-----------( 119      ( 30 Jun 2023 15:34 )             37.5       01 Jul 2023 06:57    140    |  112    |  18     ----------------------------<  115    3.7     |  22     |  0.97     30 Jun 2023 15:34    135    |  106    |  19     ----------------------------<  148    4.0     |  25     |  1.17     Ca    8.5        01 Jul 2023 06:57  Ca    9.4        30 Jun 2023 15:34    TPro  7.5    /  Alb  3.3    /  TBili  1.2    /  DBili  x      /  AST  40     /  ALT  40     /  AlkPhos  121    30 Jun 2023 15:34        THYROID STUDIES:    ===============================  ===============================  CARDIAC BIOMARKERS:  -------  -BNP VALUES:    -------  -TROPONIN VALUES:   Troponin I, High Sensitivity Result: 7.31 ng/L (07-01-23 @ 06:57)  Troponin I, High Sensitivity Result: 4.74 ng/L (06-30-23 @ 20:11)  Troponin I, High Sensitivity Result: 3.77 ng/L (06-30-23 @ 15:34)      ===============================  ===============================  EKG: NSR no ischemic changes.

## 2023-07-03 LAB
ALBUMIN SERPL ELPH-MCNC: 2.5 G/DL — LOW (ref 3.3–5)
ALP SERPL-CCNC: 91 U/L — SIGNIFICANT CHANGE UP (ref 40–120)
ALT FLD-CCNC: 51 U/L — SIGNIFICANT CHANGE UP (ref 12–78)
ANION GAP SERPL CALC-SCNC: 3 MMOL/L — LOW (ref 5–17)
APTT BLD: 30.3 SEC — SIGNIFICANT CHANGE UP (ref 27.5–35.5)
AST SERPL-CCNC: 42 U/L — HIGH (ref 15–37)
BILIRUB SERPL-MCNC: 0.8 MG/DL — SIGNIFICANT CHANGE UP (ref 0.2–1.2)
BUN SERPL-MCNC: 13 MG/DL — SIGNIFICANT CHANGE UP (ref 7–23)
CALCIUM SERPL-MCNC: 8.3 MG/DL — LOW (ref 8.5–10.1)
CHLORIDE SERPL-SCNC: 114 MMOL/L — HIGH (ref 96–108)
CO2 SERPL-SCNC: 23 MMOL/L — SIGNIFICANT CHANGE UP (ref 22–31)
CREAT SERPL-MCNC: 0.87 MG/DL — SIGNIFICANT CHANGE UP (ref 0.5–1.3)
D DIMER BLD IA.RAPID-MCNC: 2392 NG/ML DDU — HIGH
EGFR: 73 ML/MIN/1.73M2 — SIGNIFICANT CHANGE UP
ERYTHROCYTE [SEDIMENTATION RATE] IN BLOOD: 58 MM/HR — HIGH (ref 0–20)
GLUCOSE SERPL-MCNC: 143 MG/DL — HIGH (ref 70–99)
HCT VFR BLD CALC: 31.1 % — LOW (ref 34.5–45)
HGB BLD-MCNC: 10 G/DL — LOW (ref 11.5–15.5)
INR BLD: 1.22 RATIO — HIGH (ref 0.88–1.16)
LDH SERPL L TO P-CCNC: 445 U/L — HIGH (ref 84–241)
MCHC RBC-ENTMCNC: 28.7 PG — SIGNIFICANT CHANGE UP (ref 27–34)
MCHC RBC-ENTMCNC: 32.2 GM/DL — SIGNIFICANT CHANGE UP (ref 32–36)
MCV RBC AUTO: 89.4 FL — SIGNIFICANT CHANGE UP (ref 80–100)
PLATELET # BLD AUTO: 100 K/UL — LOW (ref 150–400)
POTASSIUM SERPL-MCNC: 3.8 MMOL/L — SIGNIFICANT CHANGE UP (ref 3.5–5.3)
POTASSIUM SERPL-SCNC: 3.8 MMOL/L — SIGNIFICANT CHANGE UP (ref 3.5–5.3)
PROT SERPL-MCNC: 6 GM/DL — SIGNIFICANT CHANGE UP (ref 6–8.3)
PROTHROM AB SERPL-ACNC: 14.2 SEC — HIGH (ref 10.5–13.4)
RBC # BLD: 3.48 M/UL — LOW (ref 3.8–5.2)
RBC # FLD: 15.6 % — HIGH (ref 10.3–14.5)
SODIUM SERPL-SCNC: 140 MMOL/L — SIGNIFICANT CHANGE UP (ref 135–145)
TROPONIN I, HIGH SENSITIVITY RESULT: 5.41 NG/L — SIGNIFICANT CHANGE UP
WBC # BLD: 3.87 K/UL — SIGNIFICANT CHANGE UP (ref 3.8–10.5)
WBC # FLD AUTO: 3.87 K/UL — SIGNIFICANT CHANGE UP (ref 3.8–10.5)

## 2023-07-03 PROCEDURE — 99232 SBSQ HOSP IP/OBS MODERATE 35: CPT

## 2023-07-03 PROCEDURE — 93010 ELECTROCARDIOGRAM REPORT: CPT

## 2023-07-03 PROCEDURE — 76700 US EXAM ABDOM COMPLETE: CPT | Mod: 26

## 2023-07-03 PROCEDURE — 71275 CT ANGIOGRAPHY CHEST: CPT | Mod: 26

## 2023-07-03 RX ADMIN — Medication 100 MILLIGRAM(S): at 19:03

## 2023-07-03 RX ADMIN — RALOXIFENE HYDROCHLORIDE 60 MILLIGRAM(S): 60 TABLET, COATED ORAL at 10:06

## 2023-07-03 RX ADMIN — Medication 25 MILLIGRAM(S): at 21:34

## 2023-07-03 RX ADMIN — Medication 650 MILLIGRAM(S): at 21:12

## 2023-07-03 RX ADMIN — Medication 25 MILLIGRAM(S): at 10:06

## 2023-07-03 RX ADMIN — Medication 650 MILLIGRAM(S): at 11:21

## 2023-07-03 RX ADMIN — Medication 650 MILLIGRAM(S): at 20:39

## 2023-07-03 RX ADMIN — SODIUM CHLORIDE 75 MILLILITER(S): 9 INJECTION INTRAMUSCULAR; INTRAVENOUS; SUBCUTANEOUS at 17:41

## 2023-07-03 RX ADMIN — ATORVASTATIN CALCIUM 80 MILLIGRAM(S): 80 TABLET, FILM COATED ORAL at 21:35

## 2023-07-03 NOTE — PROGRESS NOTE ADULT - SUBJECTIVE AND OBJECTIVE BOX
HOSPITALIST ATTENDING PROGRESS NOTE    Chart and meds reviewed.  Patient seen and examined.    CC: Dizziness    Subjective: Patient experiencing chest pain, jaw pain and some sob. Still febrile    All other systems reviewed and found to be negative with the exception of what has been described above.    MEDICATIONS  (STANDING):  atorvastatin 80 milliGRAM(s) Oral at bedtime  cefTRIAXone Injectable. 1000 milliGRAM(s) IV Push every 24 hours  cefTRIAXone Injectable.      metoprolol succinate ER 25 milliGRAM(s) Oral daily  raloxifene 60 milliGRAM(s) Oral daily  sodium chloride 0.9%. 1000 milliLiter(s) (75 mL/Hr) IV Continuous <Continuous>  traZODone 25 milliGRAM(s) Oral at bedtime    MEDICATIONS  (PRN):  acetaminophen     Tablet .. 650 milliGRAM(s) Oral every 6 hours PRN Temp greater or equal to 38C (100.4F), Mild Pain (1 - 3)      VITALS:  T(F): 97.7 (07-03-23 @ 10:54), Max: 100.6 (07-02-23 @ 17:02)  HR: 87 (07-03-23 @ 10:54) (78 - 97)  BP: 123/71 (07-03-23 @ 10:54) (112/67 - 128/68)  RR: 20 (07-03-23 @ 10:46) (18 - 20)  SpO2: 97% (07-03-23 @ 10:46) (87% - 100%)      PHYSICAL EXAM:  GEN: NAD  HEENT:  pupils equal and reactive, EOMI, no oropharyngeal lesions, erythema, exudates, oral thrush  NECK:   supple, no carotid bruits, no palpable lymph nodes, no thyromegaly  CV:  +S1, +S2, regular, no murmurs or rubs  RESP:   lungs clear to auscultation bilaterally, no wheezing, rales, rhonchi, good air entry bilaterally  BREAST:  not examined  GI:  abdomen soft, non-tender, non-distended, normal BS, no bruits, no abdominal masses, no palpable masses  RECTAL:  not examined  :  not examined  MSK:   normal muscle tone, no atrophy, no rigidity, no contractions  EXT:  no clubbing, no cyanosis, no edema, no calf pain, swelling or erythema  VASCULAR:  pulses equal and symmetric in the upper and lower extremities  NEURO:  AAOX3, no focal neurological deficits, follows all commands, able to move extremities spontaneously  SKIN:  no ulcers, lesions or rashes    LABS:                            10.0   3.87  )-----------( 100      ( 03 Jul 2023 06:56 )             31.1     07-03    140  |  114<H>  |  13  ----------------------------<  143<H>  3.8   |  23  |  0.87    Ca    8.3<L>      03 Jul 2023 06:56    TPro  6.0  /  Alb  2.5<L>  /  TBili  0.8  /  DBili  x   /  AST  42<H>  /  ALT  51  /  AlkPhos  91  07-03        LIVER FUNCTIONS - ( 03 Jul 2023 06:56 )  Alb: 2.5 g/dL / Pro: 6.0 gm/dL / ALK PHOS: 91 U/L / ALT: 51 U/L / AST: 42 U/L / GGT: x             Urinalysis Basic - ( 03 Jul 2023 06:56 )  Color: x / Appearance: x / SG: x / pH: x  Gluc: 143 mg/dL / Ketone: x  / Bili: x / Urobili: x   Blood: x / Protein: x / Nitrite: x   Leuk Esterase: x / RBC: x / WBC x   Sq Epi: x / Non Sq Epi: x / Bacteria: x< from:       CT Angio Chest PE Protocol w/ IV Cont (07.03.23 @ 10:36) >  IMPRESSION:    1.  No pulmonary embolism.  2.  Trace left pleural effusion.    --- End of Report ---      < end of copied text >  < from: CT Abdomen and Pelvis w/ IV Cont (06.30.23 @ 16:07) >  IMPRESSION:  No acute CT findings. No bowel obstruction or inflammation.    Scattered sigmoid diverticulosis without evidence of acute   diverticulitis. Normal appendix.      < end of copied text >  < from: CT Abdomen and Pelvis w/ IV Cont (06.30.23 @ 16:07) >  Hepatosplenomegaly.      < end of copied text >   HOSPITALIST ATTENDING PROGRESS NOTE    Chart and meds reviewed.  Patient seen and examined.    CC: Dizziness    Subjective: Patient experiencing chest pain, jaw pain and some sob. Still febrile    All other systems reviewed and found to be negative with the exception of what has been described above.    MEDICATIONS  (STANDING):  atorvastatin 80 milliGRAM(s) Oral at bedtime  cefTRIAXone Injectable. 1000 milliGRAM(s) IV Push every 24 hours  cefTRIAXone Injectable.      metoprolol succinate ER 25 milliGRAM(s) Oral daily  raloxifene 60 milliGRAM(s) Oral daily  sodium chloride 0.9%. 1000 milliLiter(s) (75 mL/Hr) IV Continuous <Continuous>  traZODone 25 milliGRAM(s) Oral at bedtime    MEDICATIONS  (PRN):  acetaminophen     Tablet .. 650 milliGRAM(s) Oral every 6 hours PRN Temp greater or equal to 38C (100.4F), Mild Pain (1 - 3)      VITALS:  T(F): 97.7 (07-03-23 @ 10:54), Max: 100.6 (07-02-23 @ 17:02)  HR: 87 (07-03-23 @ 10:54) (78 - 97)  BP: 123/71 (07-03-23 @ 10:54) (112/67 - 128/68)  RR: 20 (07-03-23 @ 10:46) (18 - 20)  SpO2: 97% (07-03-23 @ 10:46) (87% - 100%)      PHYSICAL EXAM:  GEN: NAD  HEENT:  pupils equal and reactive, EOMI, no oropharyngeal lesions, erythema, exudates, oral thrush  NECK:   supple, no carotid bruits, no palpable lymph nodes, no thyromegaly  CV:  +S1, +S2, regular, no murmurs or rubs  RESP:   lungs clear to auscultation bilaterally, no wheezing, rales, rhonchi, good air entry bilaterally  BREAST:  not examined  GI:  abdomen soft, non-tender, non-distended, normal BS, no bruits, no abdominal masses, no palpable masses  RECTAL:  not examined  :  not examined  MSK:   normal muscle tone, no atrophy, no rigidity, no contractions  EXT:  no clubbing, no cyanosis, no edema, no calf pain, swelling or erythema  VASCULAR:  pulses equal and symmetric in the upper and lower extremities  NEURO:  AAOX3, no focal neurological deficits, follows all commands, able to move extremities spontaneously  SKIN:  no ulcers, lesions or rashes    LABS:                            10.0   3.87  )-----------( 100      ( 03 Jul 2023 06:56 )             31.1     07-03    140  |  114<H>  |  13  ----------------------------<  143<H>  3.8   |  23  |  0.87    Ca    8.3<L>      03 Jul 2023 06:56    TPro  6.0  /  Alb  2.5<L>  /  TBili  0.8  /  DBili  x   /  AST  42<H>  /  ALT  51  /  AlkPhos  91  07-03        LIVER FUNCTIONS - ( 03 Jul 2023 06:56 )  Alb: 2.5 g/dL / Pro: 6.0 gm/dL / ALK PHOS: 91 U/L / ALT: 51 U/L / AST: 42 U/L / GGT: x           Mono spot negative  D-Dimer [id number]      Urinalysis Basic - ( 03 Jul 2023 06:56 )  Color: x / Appearance: x / SG: x / pH: x  Gluc: 143 mg/dL / Ketone: x  / Bili: x / Urobili: x   Blood: x / Protein: x / Nitrite: x   Leuk Esterase: x / RBC: x / WBC x   Sq Epi: x / Non Sq Epi: x / Bacteria: x< from:       CT Angio Chest PE Protocol w/ IV Cont (07.03.23 @ 10:36) >  IMPRESSION:    1.  No pulmonary embolism.  2.  Trace left pleural effusion.    --- End of Report ---      < end of copied text >  < from: CT Abdomen and Pelvis w/ IV Cont (06.30.23 @ 16:07) >  IMPRESSION:  No acute CT findings. No bowel obstruction or inflammation.    Scattered sigmoid diverticulosis without evidence of acute   diverticulitis. Normal appendix.      < end of copied text >  < from: CT Abdomen and Pelvis w/ IV Cont (06.30.23 @ 16:07) >  Hepatosplenomegaly.      < end of copied text >

## 2023-07-03 NOTE — PROVIDER CONTACT NOTE (CRITICAL VALUE NOTIFICATION) - SITUATION
pt with lower left flank pain O2 sats 89-90% on room air, 94% on oxygen 2l via nc. d-dimer elevated.

## 2023-07-03 NOTE — CONSULT NOTE ADULT - SUBJECTIVE AND OBJECTIVE BOX
HPI:    68 y/o female with MH of HTN, HLD who presented to [place] ED c/o weakness, fatigue, dizziness, near-syncope. Pt also had a temperature of 99. F at home with chills,  diaphoresis, headache for four days. She reported left sided abdominal pain  for nearly a month. She reports nausea and denies vomiting.      Pt reports she saw her cardiologist for some of her symptoms including weakness last month and had an echo which was reportedly  normal.  She states she had a neg nuclear stress test , two years ago. She has never had a colonoscopy as she reports she is too occupied caring for  and has not had the time.       Pt is allergic to Augmentin. (01 Jul 2023 00:33)      PAST MEDICAL & SURGICAL HISTORY:    HTN (hypertension)  HLD (hyperlipidemia)  No significant past surgical history      FAMILY HISTORY:    dementia (Mother)    colon cancer (Father)      MEDICATIONS  (STANDING):    atorvastatin 80 milliGRAM(s) Oral at bedtime  cefTRIAXone Injectable. 1000 milliGRAM(s) IV Push every 24 hours  cefTRIAXone Injectable.      metoprolol succinate ER 25 milliGRAM(s) Oral daily  raloxifene 60 milliGRAM(s) Oral daily  sodium chloride 0.9%. 1000 milliLiter(s) (75 mL/Hr) IV Continuous <Continuous>  traZODone 25 milliGRAM(s) Oral at bedtime    MEDICATIONS  (PRN):    acetaminophen Tablet -650 milliGRAM(s) Oral every 6 hours PRN Temp greater or equal to 38C (100.4F), Mild Pain (1 - 3)      Allergies    Augmentin (Vomiting; Nausea)      Review of Systems    Constitutional, Eyes, ENT, Cardiovascular, Respiratory, Gastrointestinal, Genitourinary, Musculoskeletal, Integumentary, Neurological, Psychiatric, Endocrine, Heme/Lymph and Allergic/Immunologic review of systems are otherwise negative except as noted in HPI.     Vital Signs Last 24 Hrs    T(C): 36.5 (03 Jul 2023 10:54), Max: 38.1 (02 Jul 2023 17:02)  T(F): 97.7 (03 Jul 2023 10:54), Max: 100.6 (02 Jul 2023 17:02)  HR: 87 (03 Jul 2023 10:54) (78 - 97)  BP: 123/71 (03 Jul 2023 10:54) (112/67 - 128/68)  BP(mean): 84 (02 Jul 2023 17:02) (84 - 84)  RR: 20 (03 Jul 2023 10:46) (18 - 20)  SpO2: 97% (03 Jul 2023 10:46) (87% - 100%)    Parameters below as of 03 Jul 2023 10:46  Patient On (Oxygen Delivery Method): nasal cannula  O2 Flow (L/min): 2    Physical Exam    Eyes: no conjunctival infection, anicteric.   ENT: pharynx is unremarkable, moist mucus membrane, no oral lesions.   Neck: supple without JVD, no thyromegaly or masses appreciated.   Pulmonary: clear to auscultation bilaterally, no dullness, no wheezing.   Cardiac: RRR, normal S1S2, no murmurs, rubs, gallops.   Vascular: no JVD, no calf tenderness, venous stasis changes, varices.   Abdomen: normoactive bowel sounds, soft and nontender, no hepatosplenomegaly or masses appreciated.   Lymphatic: no peripheral adenopathy appreciated.   Musculoskeletal: full range of motion and no deformities appreciated.   Skin: normal appearance, no rash, nodules, vesicles, ulcers, erythema.   Neurology: grossly intact.   Psychiatric: affect appropriate.     LABS:    CBC Full  -  ( 03 Jul 2023 06:56 )  WBC Count : 3.87 K/uL  RBC Count : 3.48 M/uL  Hemoglobin : 10.0 g/dL  Hematocrit : 31.1 %  Platelet Count - Automated : 100 K/uL  Mean Cell Volume : 89.4 fl  Mean Cell Hemoglobin : 28.7 pg  Mean Cell Hemoglobin Concentration : 32.2 gm/dL  Auto Neutrophil # : x  Auto Lymphocyte # : x  Auto Monocyte # : x  Auto Eosinophil # : x  Auto Basophil # : x  Auto Neutrophil % : x  Auto Lymphocyte % : x  Auto Monocyte % : x  Auto Eosinophil % : x  Auto Basophil % : x    07-03    140  |  114<H>  |  13  ----------------------------<  143<H>  3.8   |  23  |  0.87    Ca    8.3<L>      03 Jul 2023 06:56    TPro  6.0  /  Alb  2.5<L>  /  TBili  0.8  /  DBili  x   /  AST  42<H>  /  ALT  51  /  AlkPhos  91  07-03    Urinalysis Basic - ( 03 Jul 2023 06:56 )    Color: x / Appearance: x / SG: x / pH: x  Gluc: 143 mg/dL / Ketone: x  / Bili: x / Urobili: x   Blood: x / Protein: x / Nitrite: x   Leuk Esterase: x / RBC: x / WBC x   Sq Epi: x / Non Sq Epi: x / Bacteria: x    RADIOLOGY & ADDITIONAL STUDIES:    CT Angio Chest PE Protocol w/ IV Cont (07.03.23)    INTERPRETATION:  INDICATION: Hypoxic, short of breath, and chest pain.   Assess for pulmonary embolism.    TECHNIQUE: Helical acquisition of the chest after the administration of   60 mL of Omnipaque 350. Maximum intensity projection images were   generated.    COMPARISON: Chest radiograph dated 6/30/2023, CT abdomen and pelvis dated   6/30/2023..    FINDINGS:    PULMONARY ANGIOGRAM:  No pulmonary embolism.    LUNGS/AIRWAYS/PLEURA: Patent central airways. Emphysema. Trace left   pleural effusion. Bilateral lower lobe atelectasis. Elevated left   hemidiaphragm.    LYMPH NODES/MEDIASTINUM: 2.2 cm right thyroid nodule. No lymphadenopathy.    HEART/VASCULATURE: Heart size is within normal limits. Coronary artery   calcifications. Atherosclerotic disease of aorta.    UPPER ABDOMEN: Small hiatal hernia. Splenomegaly.    BONES/SOFT TISSUES: Degenerative changes.      IMPRESSION:    1.  No pulmonary embolism.  2.  Trace left pleural effusion.      EXAM:  CT ABDOMEN AND PELVIS IC    PROCEDURE DATE:  06/30/2023      INTERPRETATION:  CLINICAL INFORMATION: Left lower quadrant pain    COMPARISON: None.    CONTRAST/COMPLICATIONS:  IV Contrast: Omnipaque 350  90 cc administered   10 cc discarded  Oral Contrast: NONE  Complications: None reported at time of study completion    PROCEDURE:  CT of the Abdomen and Pelvis was performed.  Sagittal and coronal reformats were performed.    FINDINGS:  LOWER CHEST: Within normal limits.    LIVER: Liver is enlarged and measures 18 cm in length.  BILE DUCTS: Normal caliber.  GALLBLADDER: Within normal limits.  SPLEEN: Splenomegaly. Spleen measures 15 cm in length.  PANCREAS: Within normal limits.  ADRENALS: Within normal limits.  KIDNEYS/URETERS: Within normal limits.    BLADDER: Within normal limits.  REPRODUCTIVE ORGANS: Uterus and adnexa within normal limits.    BOWEL: No bowel obstruction. Moderate hiatal hernia. Normal appendix.   Scattered sigmoid diverticulosis without evidence of acute diverticulitis.  PERITONEUM: No ascites.  VESSELS: Atherosclerotic changes. The abdominal aorta is ectatic   measuring up to 2.9 cm in maximal axial diameter.  RETROPERITONEUM/LYMPH NODES: No lymphadenopathy.  ABDOMINAL WALL: Within normal limits.  BONES: Degenerative changes.    IMPRESSION:  No acute CT findings. No bowel obstruction or inflammation.    Scattered sigmoid diverticulosis without evidence of acute   diverticulitis. Normal appendix.    Hepatosplenomegaly.         HPI:    68 y/o female with MH of HTN, HLD who presented to [place] ED c/o weakness, fatigue, dizziness, near-syncope. Pt also had a temperature of 99. F at home with chills,  diaphoresis, headache for four days. She reported left sided abdominal pain  for nearly a month. She reports nausea and denies vomiting.      Pt reports she saw her cardiologist for some of her symptoms including weakness last month and had an echo which was reportedly  normal.  She states she had a neg nuclear stress test , two years ago. She has never had a colonoscopy.    7/3/2023- Seen at bedside, in no acute distress. Denies easy bruising bleeding, no hematuria, nose bleed, gum bleed or hematochezia     PAST MEDICAL & SURGICAL HISTORY:    HTN (hypertension)  HLD (hyperlipidemia)  No significant past surgical history      FAMILY HISTORY:    dementia (Mother)    colon cancer (Father)      MEDICATIONS  (STANDING):    atorvastatin 80 milliGRAM(s) Oral at bedtime  cefTRIAXone Injectable. 1000 milliGRAM(s) IV Push every 24 hours  cefTRIAXone Injectable.      metoprolol succinate ER 25 milliGRAM(s) Oral daily  raloxifene 60 milliGRAM(s) Oral daily  sodium chloride 0.9%. 1000 milliLiter(s) (75 mL/Hr) IV Continuous <Continuous>  traZODone 25 milliGRAM(s) Oral at bedtime    MEDICATIONS  (PRN):    acetaminophen Tablet -650 milliGRAM(s) Oral every 6 hours PRN Temp greater or equal to 38C (100.4F), Mild Pain (1 - 3)      Allergies    Augmentin (Vomiting; Nausea)      Review of Systems    Constitutional, Eyes, ENT, Cardiovascular, Respiratory, Gastrointestinal, Genitourinary, Musculoskeletal, Integumentary, Neurological, Psychiatric, Endocrine, Heme/Lymph and Allergic/Immunologic review of systems are otherwise negative except as noted in HPI.     Vital Signs Last 24 Hrs    T(C): 36.5 (03 Jul 2023 10:54), Max: 38.1 (02 Jul 2023 17:02)  T(F): 97.7 (03 Jul 2023 10:54), Max: 100.6 (02 Jul 2023 17:02)  HR: 87 (03 Jul 2023 10:54) (78 - 97)  BP: 123/71 (03 Jul 2023 10:54) (112/67 - 128/68)  BP(mean): 84 (02 Jul 2023 17:02) (84 - 84)  RR: 20 (03 Jul 2023 10:46) (18 - 20)  SpO2: 97% (03 Jul 2023 10:46) (87% - 100%)    Parameters below as of 03 Jul 2023 10:46  Patient On (Oxygen Delivery Method): nasal cannula  O2 Flow (L/min): 2    Physical Exam    Eyes: no conjunctival infection, anicteric.   ENT: pharynx is unremarkable, moist mucus membrane, no oral lesions.   Neck: supple without JVD, no thyromegaly or masses appreciated.   Pulmonary: clear to auscultation bilaterally, no dullness, no wheezing.   Cardiac: RRR, normal S1S2, no murmurs, rubs, gallops.   Vascular: no JVD, no calf tenderness, venous stasis changes, varices.   Abdomen: normoactive bowel sounds, soft and nontender, no hepatosplenomegaly or masses appreciated.   Lymphatic: no peripheral adenopathy appreciated.   Musculoskeletal: full range of motion and no deformities appreciated.   Skin: normal appearance, no rash, nodules, vesicles, ulcers, erythema.   Neurology: grossly intact.   Psychiatric: affect appropriate.     LABS:    CBC Full  -  ( 03 Jul 2023 06:56 )  WBC Count : 3.87 K/uL  RBC Count : 3.48 M/uL  Hemoglobin : 10.0 g/dL  Hematocrit : 31.1 %  Platelet Count - Automated : 100 K/uL  Mean Cell Volume : 89.4 fl  Mean Cell Hemoglobin : 28.7 pg  Mean Cell Hemoglobin Concentration : 32.2 gm/dL  Auto Neutrophil # : x  Auto Lymphocyte # : x  Auto Monocyte # : x  Auto Eosinophil # : x  Auto Basophil # : x  Auto Neutrophil % : x  Auto Lymphocyte % : x  Auto Monocyte % : x  Auto Eosinophil % : x  Auto Basophil % : x    07-03    140  |  114<H>  |  13  ----------------------------<  143<H>  3.8   |  23  |  0.87    Ca    8.3<L>      03 Jul 2023 06:56    TPro  6.0  /  Alb  2.5<L>  /  TBili  0.8  /  DBili  x   /  AST  42<H>  /  ALT  51  /  AlkPhos  91  07-03    Urinalysis Basic - ( 03 Jul 2023 06:56 )    Color: x / Appearance: x / SG: x / pH: x  Gluc: 143 mg/dL / Ketone: x  / Bili: x / Urobili: x   Blood: x / Protein: x / Nitrite: x   Leuk Esterase: x / RBC: x / WBC x   Sq Epi: x / Non Sq Epi: x / Bacteria: x    RADIOLOGY & ADDITIONAL STUDIES:    CT Angio Chest PE Protocol w/ IV Cont (07.03.23)    INTERPRETATION:  INDICATION: Hypoxic, short of breath, and chest pain.   Assess for pulmonary embolism.    TECHNIQUE: Helical acquisition of the chest after the administration of   60 mL of Omnipaque 350. Maximum intensity projection images were   generated.    COMPARISON: Chest radiograph dated 6/30/2023, CT abdomen and pelvis dated   6/30/2023..    FINDINGS:    PULMONARY ANGIOGRAM:  No pulmonary embolism.    LUNGS/AIRWAYS/PLEURA: Patent central airways. Emphysema. Trace left   pleural effusion. Bilateral lower lobe atelectasis. Elevated left   hemidiaphragm.    LYMPH NODES/MEDIASTINUM: 2.2 cm right thyroid nodule. No lymphadenopathy.    HEART/VASCULATURE: Heart size is within normal limits. Coronary artery   calcifications. Atherosclerotic disease of aorta.    UPPER ABDOMEN: Small hiatal hernia. Splenomegaly.    BONES/SOFT TISSUES: Degenerative changes.      IMPRESSION:    1.  No pulmonary embolism.  2.  Trace left pleural effusion.      EXAM:  CT ABDOMEN AND PELVIS IC    PROCEDURE DATE:  06/30/2023      INTERPRETATION:  CLINICAL INFORMATION: Left lower quadrant pain    COMPARISON: None.    CONTRAST/COMPLICATIONS:  IV Contrast: Omnipaque 350  90 cc administered   10 cc discarded  Oral Contrast: NONE  Complications: None reported at time of study completion    PROCEDURE:  CT of the Abdomen and Pelvis was performed.  Sagittal and coronal reformats were performed.    FINDINGS:  LOWER CHEST: Within normal limits.    LIVER: Liver is enlarged and measures 18 cm in length.  BILE DUCTS: Normal caliber.  GALLBLADDER: Within normal limits.  SPLEEN: Splenomegaly. Spleen measures 15 cm in length.  PANCREAS: Within normal limits.  ADRENALS: Within normal limits.  KIDNEYS/URETERS: Within normal limits.    BLADDER: Within normal limits.  REPRODUCTIVE ORGANS: Uterus and adnexa within normal limits.    BOWEL: No bowel obstruction. Moderate hiatal hernia. Normal appendix.   Scattered sigmoid diverticulosis without evidence of acute diverticulitis.  PERITONEUM: No ascites.  VESSELS: Atherosclerotic changes. The abdominal aorta is ectatic   measuring up to 2.9 cm in maximal axial diameter.  RETROPERITONEUM/LYMPH NODES: No lymphadenopathy.  ABDOMINAL WALL: Within normal limits.  BONES: Degenerative changes.    IMPRESSION:  No acute CT findings. No bowel obstruction or inflammation.    Scattered sigmoid diverticulosis without evidence of acute   diverticulitis. Normal appendix.    Hepatosplenomegaly.

## 2023-07-03 NOTE — PROGRESS NOTE ADULT - ASSESSMENT
66 y/o female with PMHx of HTN, HLD who presented to [place] ED c/o weakness, fatigue, dizziness, near-syncope.      # Presyncope  - Likely due to viral syndrome and/or dehydration  - Repeat orthostatics in am  - Echo reviewed  - SP Fluid bolus in ED  - Cardiology consult appreciated  - Stable on monitor    # HSM  - Monospot negative  - With thrombocytopenia  - Elevated ESR  - Heme consult     # Abdominal pain  - Abd CT neg for acute pathology but with IV contrast  - ? Stone, check abd US    # Fever  - No obvious source, viral syndrome vs atelectasis?   - ID consult    # DVT prophylaxis  - Ambulation    # Disposition: Full code 66 y/o female with PMHx of HTN, HLD who presented to [place] ED c/o weakness, fatigue, dizziness, near-syncope.      # Presyncope  - Likely due to viral syndrome and/or dehydration  - Repeat orthostatics in am  - Echo reviewed  - SP Fluid bolus in ED  - Cardiology consult appreciated  - Stable on monitor    # Hypoxia  - Likely due to atelectasis  - CTA negative for PE   - Incentive spirometer    # HSM  - Monospot negative  - With thrombocytopenia  - Elevated ESR  - Check Tick born disease  - Heme consult     # Abdominal pain  - Abd CT neg for acute pathology but with IV contrast  - ? Stone, check abd US    # Fever  - No obvious source, viral syndrome vs atelectasis?   - ID consult    # DVT prophylaxis  - Ambulation    # Disposition: Full code 66 y/o female with PMHx of HTN, HLD who presented to [place] ED c/o weakness, fatigue, dizziness, near-syncope.      # Presyncope  - Likely due to viral syndrome and/or dehydration  - Repeat orthostatics in am  - Echo reviewed  - SP Fluid bolus in ED  - Cardiology consult appreciated  - Stable on monitor    # Hypoxia  - Likely due to atelectasis  - CTA negative for PE   - Incentive spirometer    # HSM  - Monospot negative  - With thrombocytopenia  - Elevated ESR  - Check Tick born disease  - Heme consult     # Abdominal pain  - Abd CT neg for acute pathology but with IV contrast  - ? Stone, check abd US    # Fever  - No obvious source, viral syndrome vs atelectasis?   - Work up neg so far  - Check Tick panel and EBV titers  - ID consult    # DVT prophylaxis  - Ambulation    # Disposition: Full code

## 2023-07-03 NOTE — CONSULT NOTE ADULT - ASSESSMENT
68 y/o female with h/o HTN, HLD was admitted on 7/3 for weakness, fatigue, dizziness, near-syncope.  Pt had a temperature of 99. F at home associated with chills, diaphoresis, headache for four days PTA. She reported left sided abdominal pain for nearly a month PTA associated with nausea, but no vomiting. In ER the patient was found dehydrated with mild hypoxia. SHe continued to have low grade fever. She received ceftriaxone.     1. Low grade fever. Thrombocytopenia. Splenomegaly. Possible tick related illness. Possible viral syndrome.  -ea1lgvbxy is negative  -cultures noted  -obtain Lyme AB, babesia PCR, ehrlichia PCR, WNV, RMSF PCR  -obtain EBV, CMV, ESR, CRP, LDH  -would start doxycycline 100 mg PO q12h pending further workup  -d/c ceftriaxone  -old chart reviewed to assess prior cultures  -monitor temps  -f/u CBC  -supportive care  2. Other issues:   -care per medicine

## 2023-07-03 NOTE — CONSULT NOTE ADULT - REASON FOR ADMISSION
Pre-syncope   Vtach  5 beat on ED telemetry  Abd pain

## 2023-07-03 NOTE — CONSULT NOTE ADULT - SUBJECTIVE AND OBJECTIVE BOX
Patient is a 67y old  Female who presents with a chief complaint of Pre-syncope   Vtach  5 beat on ED telemetry  Abd pain     HPI:  68 y/o female with h/o HTN, HLD was admitted on 7/3 for weakness, fatigue, dizziness, near-syncope.  Pt had a temperature of 99. F at home associated with chills, diaphoresis, headache for four days PTA. She reported left sided abdominal pain for nearly a month PTA associated with nausea, but no vomiting. In ER the patient was found dehydrated with mild hypoxia. SHe continued to have low grade fever. She received ceftriaxone.     PMH: as above  PSH: as above  Meds: per reconciliation sheet, noted below  MEDICATIONS  (STANDING):  atorvastatin 80 milliGRAM(s) Oral at bedtime  cefTRIAXone Injectable. 1000 milliGRAM(s) IV Push every 24 hours  cefTRIAXone Injectable.      metoprolol succinate ER 25 milliGRAM(s) Oral daily  raloxifene 60 milliGRAM(s) Oral daily  sodium chloride 0.9%. 1000 milliLiter(s) (75 mL/Hr) IV Continuous <Continuous>  traZODone 25 milliGRAM(s) Oral at bedtime    MEDICATIONS  (PRN):  acetaminophen     Tablet .. 650 milliGRAM(s) Oral every 6 hours PRN Temp greater or equal to 38C (100.4F), Mild Pain (1 - 3)    Allergies    No Known Allergies    Intolerances    Augmentin (Vomiting; Nausea)    Social: no smoking, no alcohol, no illegal drugs; no recent travel, no exposure to TB  FAMILY HISTORY:  FHx: dementia (Mother)    Family history of colon cancer (Father)      no history of premature cardiovascular disease in first degree relatives    ROS: the patient denies fever, no chills, no HA, no seizures, no dizziness, no sore throat, no nasal congestion, no blurry vision, no CP, no palpitations, no SOB, no cough, no abdominal pain, no diarrhea, no N/V, no dysuria, no leg pain, no claudication, no rash, no joint aches, no rectal pain or bleeding, no night sweats  All other systems reviewed and are negative    Vital Signs Last 24 Hrs  T(C): 36.6 (03 Jul 2023 15:54), Max: 38.1 (02 Jul 2023 17:02)  T(F): 97.8 (03 Jul 2023 15:54), Max: 100.6 (02 Jul 2023 17:02)  HR: 78 (03 Jul 2023 15:54) (78 - 97)  BP: 112/66 (03 Jul 2023 15:54) (112/66 - 128/68)  BP(mean): 81 (03 Jul 2023 15:54) (81 - 84)  RR: 20 (03 Jul 2023 10:46) (18 - 20)  SpO2: 94% (03 Jul 2023 15:54) (87% - 100%)    Parameters below as of 03 Jul 2023 15:54  Patient On (Oxygen Delivery Method): nasal cannula    PE:    Constitutional:  No acute distress  HEENT: NC/AT, EOMI, PERRLA, conjunctivae clear; ears and nose atraumatic; pharynx benign  Neck: supple; thyroid not palpable  Back: no tenderness  Respiratory: respiratory effort normal; clear to auscultation  Cardiovascular: S1S2 regular, no murmurs  Abdomen: soft, not tender, not distended, positive BS; no liver or spleen organomegaly  Genitourinary: no suprapubic tenderness  Lymphatic: no LN palpable  Musculoskeletal: no muscle tenderness, no joint swelling or tenderness  Extremities: no pedal edema  Neurological/ Psychiatric: AxOx3, judgement and insight normal; moving all extremities  Skin: no rashes; no palpable lesions    Labs: all available labs reviewed                        10.0   3.87  )-----------( 100      ( 03 Jul 2023 06:56 )             31.1     07-03    140  |  114<H>  |  13  ----------------------------<  143<H>  3.8   |  23  |  0.87    Ca    8.3<L>      03 Jul 2023 06:56    TPro  6.0  /  Alb  2.5<L>  /  TBili  0.8  /  DBili  x   /  AST  42<H>  /  ALT  51  /  AlkPhos  91  07-03     LIVER FUNCTIONS - ( 03 Jul 2023 06:56 )  Alb: 2.5 g/dL / Pro: 6.0 gm/dL / ALK PHOS: 91 U/L / ALT: 51 U/L / AST: 42 U/L / GGT: x           Urinalysis Basic - ( 03 Jul 2023 06:56 )    Color: x / Appearance: x / SG: x / pH: x  Gluc: 143 mg/dL / Ketone: x  / Bili: x / Urobili: x   Blood: x / Protein: x / Nitrite: x   Leuk Esterase: x / RBC: x / WBC x   Sq Epi: x / Non Sq Epi: x / Bacteria: x    (06-30 @ 19:30)  NotDetec    Culture - Blood (collected 30 Jun 2023 20:11)  Source: .Blood Blood/ Aerobic plus received  Preliminary Report (03 Jul 2023 02:02):    No growth at 48 Hours    Culture - Blood (collected 30 Jun 2023 20:11)  Source: .Blood Blood / Aerobic plus received  Preliminary Report (03 Jul 2023 02:02):    No growth at 48 Hours    Culture - Urine (collected 30 Jun 2023 19:30)  Source: Clean Catch Clean Catch (Midstream)  Final Report (02 Jul 2023 17:07):    <10,000 CFU/mL Normal Urogenital Sonal    Radiology: all available radiological tests reviewed    < from: CT Angio Chest PE Protocol w/ IV Cont (07.03.23 @ 10:36) >  1.  No pulmonary embolism.  2.  Trace left pleural effusion.    < end of copied text >  < from: CT Abdomen and Pelvis w/ IV Cont (06.30.23 @ 16:07) >  SPLEEN: Splenomegaly. Spleen measures 15 cm in length.  Scattered sigmoid diverticulosis without evidence of acute diverticulitis. Normal appendix.  Hepatosplenomegaly.  < end of copied text >      Advanced directives addressed: full resuscitation

## 2023-07-03 NOTE — CONSULT NOTE ADULT - ASSESSMENT
66 y/o female with MH of HTN, HLD who presented to [place] ED c/o weakness, fatigue, dizziness, near-syncope, found to have thrombocytopenia.    # Thrombocytopenia    -Platelets -119 K/ul from 6/30- most recent record from 6/30, no other records in EHR.   -Unclear, however etiology of thrombocytopenia: possible decreased TPO production with portal HTN and splenic sequestration due to hepatosplenomegaly- as evident on CT-A/P-6/30  -Also Inflammation / infection vs drug-induced thrombocytopenia (non-immune) causes of acute decline in platelets. Other causes include immune-mediated platelet destruction/ ITP, bone marrow suppression, or platelet consumption as in DIC.   -Noted to have UTI with pyuria--->UA-11-25/HPF  -r/o DIC --> ordered PT, aPTT, fibrinogen and LDH, Consumption due to  compensated DIC - less likely - d-dimers--->high.    -send PF4 ab to r/o HIT if any recent heparin exposure.   -ordered folate and B12 and iron studies to r/o nutritional deficiencies  -check HIV, CMV, EBV Hep B, Tick born Ds  -Monospot negative  -Monitor serial CBC  -will review peripheral smear    # Presyncope    - CTA--> Negative for PE  - Likely due to viral syndrome and/or dehydration  - SP Fluid bolus in ED  - Cardiology following -->ECHO-normal EF-> 65%  - Stable on monitor       66 y/o female with MH of HTN, HLD who presented to [place] ED c/o weakness, fatigue, dizziness, near-syncope, found to have thrombocytopenia.    # Thrombocytopenia    -Platelets -119 K/ul from 6/30- most recent record from 6/30, no other records in EHR.   -Unclear, however etiology of thrombocytopenia: possible decreased TPO production with portal HTN and splenic sequestration due to hepatosplenomegaly- as evident on CT-A/P-6/30  -Also Inflammation / infection vs drug-induced thrombocytopenia (non-immune) causes of acute decline in platelets. Other causes include immune-mediated platelet destruction/ ITP, bone marrow suppression, or platelet consumption as in DIC.   -Denies any new medications add on recently, no recent infection/antibiotic exposure. Denies any easy bruising, bleeding. 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 PCP- Dr. Casey Starks, who she saw -6-9 months ago.  -Noted to have UTI with pyuria--->UA-11-25/HPF  -consumption due to compensated DIC - less likely, but d-dimers--->high, ordered PT, aPTT, fibrinogen and LDH.,   -HIT unlikely - no recent heparin exposure.   -ordered folate and B12 and iron studies to r/o nutritional deficiencies  -check HIV, CMV, EBV Hep B, Tick born Ds  -Monospot negative  -Monitor serial CBC    # Presyncope    - CTA--> Negative for PE  - Likely due to viral syndrome and/or dehydration  - SP Fluid bolus in ED  - Cardiology following -->ECHO-normal EF-> 65%  - Stable on monitor       66 y/o female with MH of HTN, HLD who presented to [place] ED c/o weakness, fatigue, dizziness, near-syncope, found to have thrombocytopenia.    # Thrombocytopenia    -Platelets -119 K/ul from 6/30- most recent record from 6/30, no other records in EHR.   -Unclear, however etiology of thrombocytopenia possibly d/t decreased TPO production with portal HTN and splenic sequestration due to hepatosplenomegaly- as evident on CT-A/P-6/30  -Also Inflammation / infection vs drug-induced thrombocytopenia (non-immune) causes of acute decline in platelets. Other causes include immune-mediated platelet destruction/ ITP, bone marrow suppression, or platelet consumption as in DIC.   -Denies any new medications recently; no recent infection/antibiotic exposure. Denies any easy bruising, bleeding.   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 PCP- Dr. Casey Starks, who she saw -6-9 months ago.  -Noted to have UTI with pyuria--->UA-11-25/HPF; ? plt consumption due to compensated DIC less likely, but d-dimers high.  Ordered PT, aPTT, fibrinogen and LDH.,   -HIT unlikely - no recent heparin exposure.   -ordered folate, B12 and iron studies to r/o nutritional deficiencies  -check HIV, CMV, EBV Hep B, Tick born dz  -Monospot negative  -Monitor serial CBC    # Presyncope    - CTA--> Negative for PE  - Likely due to viral syndrome and/or dehydration  - SP Fluid bolus in ED  - Cardiology following -->ECHO-normal EF-> 65%  - Stable on monitor

## 2023-07-04 LAB
ALBUMIN SERPL ELPH-MCNC: 2.3 G/DL — LOW (ref 3.3–5)
ALP SERPL-CCNC: 90 U/L — SIGNIFICANT CHANGE UP (ref 40–120)
ALT FLD-CCNC: 52 U/L — SIGNIFICANT CHANGE UP (ref 12–78)
ANION GAP SERPL CALC-SCNC: 6 MMOL/L — SIGNIFICANT CHANGE UP (ref 5–17)
AST SERPL-CCNC: 43 U/L — HIGH (ref 15–37)
B BURGDOR C6 AB SER-ACNC: ABNORMAL
B BURGDOR IGG+IGM SER QL IB: SIGNIFICANT CHANGE UP
B BURGDOR IGG+IGM SER-ACNC: 0.91 INDEX — HIGH (ref 0.01–0.89)
BABESIA MICROTI PCR, BLD RESULT: DETECTED
BILIRUB SERPL-MCNC: 0.7 MG/DL — SIGNIFICANT CHANGE UP (ref 0.2–1.2)
BUN SERPL-MCNC: 10 MG/DL — SIGNIFICANT CHANGE UP (ref 7–23)
CALCIUM SERPL-MCNC: 8.2 MG/DL — LOW (ref 8.5–10.1)
CHLORIDE SERPL-SCNC: 112 MMOL/L — HIGH (ref 96–108)
CMV IGG FLD QL: >10 U/ML — HIGH
CMV IGG SERPL-IMP: POSITIVE
CMV IGM FLD-ACNC: 25.5 AU/ML — SIGNIFICANT CHANGE UP
CMV IGM SERPL QL: NEGATIVE — SIGNIFICANT CHANGE UP
CO2 SERPL-SCNC: 22 MMOL/L — SIGNIFICANT CHANGE UP (ref 22–31)
CREAT SERPL-MCNC: 0.75 MG/DL — SIGNIFICANT CHANGE UP (ref 0.5–1.3)
CRP SERPL-MCNC: 52 MG/L — HIGH
EBV EA AB SER IA-ACNC: 6.2 U/ML — SIGNIFICANT CHANGE UP
EBV EA AB SER IA-ACNC: 7.3 U/ML — SIGNIFICANT CHANGE UP
EBV EA AB TITR SER IF: POSITIVE
EBV EA AB TITR SER IF: POSITIVE
EBV EA IGG SER-ACNC: NEGATIVE — SIGNIFICANT CHANGE UP
EBV EA IGG SER-ACNC: NEGATIVE — SIGNIFICANT CHANGE UP
EBV NA IGG SER IA-ACNC: 33.2 U/ML — HIGH
EBV NA IGG SER IA-ACNC: 67.8 U/ML — HIGH
EBV PATRN SPEC IB-IMP: SIGNIFICANT CHANGE UP
EBV PATRN SPEC IB-IMP: SIGNIFICANT CHANGE UP
EBV VCA IGG AVIDITY SER QL IA: POSITIVE
EBV VCA IGG AVIDITY SER QL IA: POSITIVE
EBV VCA IGM SER IA-ACNC: 159 U/ML — HIGH
EBV VCA IGM SER IA-ACNC: 169 U/ML — HIGH
EBV VCA IGM SER IA-ACNC: >160 U/ML — HIGH
EBV VCA IGM SER IA-ACNC: >160 U/ML — HIGH
EBV VCA IGM TITR FLD: POSITIVE
EBV VCA IGM TITR FLD: POSITIVE
EGFR: 87 ML/MIN/1.73M2 — SIGNIFICANT CHANGE UP
FERRITIN SERPL-MCNC: 678 NG/ML — HIGH (ref 13–330)
FOLATE SERPL-MCNC: 11.6 NG/ML — SIGNIFICANT CHANGE UP
GLUCOSE SERPL-MCNC: 121 MG/DL — HIGH (ref 70–99)
HCT VFR BLD CALC: 29.7 % — LOW (ref 34.5–45)
HGB BLD-MCNC: 9.9 G/DL — LOW (ref 11.5–15.5)
IRON SATN MFR SERPL: 20 % — SIGNIFICANT CHANGE UP (ref 14–50)
IRON SATN MFR SERPL: 32 UG/DL — SIGNIFICANT CHANGE UP (ref 30–160)
LYME IGG AB: 0.11 INDEX — SIGNIFICANT CHANGE UP (ref 0.01–0.9)
LYME IGG INTERP: NEGATIVE — SIGNIFICANT CHANGE UP
LYME IGM AB: 0.7 INDEX — SIGNIFICANT CHANGE UP (ref 0.01–0.9)
LYME IGM INTERP: NEGATIVE — SIGNIFICANT CHANGE UP
MCHC RBC-ENTMCNC: 29.4 PG — SIGNIFICANT CHANGE UP (ref 27–34)
MCHC RBC-ENTMCNC: 33.3 GM/DL — SIGNIFICANT CHANGE UP (ref 32–36)
MCV RBC AUTO: 88.1 FL — SIGNIFICANT CHANGE UP (ref 80–100)
PLATELET # BLD AUTO: 118 K/UL — LOW (ref 150–400)
POTASSIUM SERPL-MCNC: 3.8 MMOL/L — SIGNIFICANT CHANGE UP (ref 3.5–5.3)
POTASSIUM SERPL-SCNC: 3.8 MMOL/L — SIGNIFICANT CHANGE UP (ref 3.5–5.3)
PROT SERPL-MCNC: 6 GM/DL — SIGNIFICANT CHANGE UP (ref 6–8.3)
RBC # BLD: 3.37 M/UL — LOW (ref 3.8–5.2)
RBC # FLD: 15.4 % — HIGH (ref 10.3–14.5)
SODIUM SERPL-SCNC: 140 MMOL/L — SIGNIFICANT CHANGE UP (ref 135–145)
TIBC SERPL-MCNC: 160 UG/DL — LOW (ref 220–430)
UIBC SERPL-MCNC: 128 UG/DL — SIGNIFICANT CHANGE UP (ref 110–370)
VIT B12 SERPL-MCNC: 433 PG/ML — SIGNIFICANT CHANGE UP (ref 232–1245)
WBC # BLD: 4.24 K/UL — SIGNIFICANT CHANGE UP (ref 3.8–10.5)
WBC # FLD AUTO: 4.24 K/UL — SIGNIFICANT CHANGE UP (ref 3.8–10.5)

## 2023-07-04 PROCEDURE — 99222 1ST HOSP IP/OBS MODERATE 55: CPT

## 2023-07-04 PROCEDURE — 99233 SBSQ HOSP IP/OBS HIGH 50: CPT

## 2023-07-04 RX ORDER — AZITHROMYCIN 500 MG/1
500 TABLET, FILM COATED ORAL EVERY 24 HOURS
Refills: 0 | Status: DISCONTINUED | OUTPATIENT
Start: 2023-07-05 | End: 2023-07-06

## 2023-07-04 RX ORDER — ATOVAQUONE 750 MG/5ML
750 SUSPENSION ORAL EVERY 12 HOURS
Refills: 0 | Status: DISCONTINUED | OUTPATIENT
Start: 2023-07-04 | End: 2023-07-06

## 2023-07-04 RX ORDER — AZITHROMYCIN 500 MG/1
TABLET, FILM COATED ORAL
Refills: 0 | Status: DISCONTINUED | OUTPATIENT
Start: 2023-07-04 | End: 2023-07-06

## 2023-07-04 RX ORDER — AZITHROMYCIN 500 MG/1
500 TABLET, FILM COATED ORAL ONCE
Refills: 0 | Status: COMPLETED | OUTPATIENT
Start: 2023-07-04 | End: 2023-07-04

## 2023-07-04 RX ADMIN — Medication 650 MILLIGRAM(S): at 21:59

## 2023-07-04 RX ADMIN — SODIUM CHLORIDE 75 MILLILITER(S): 9 INJECTION INTRAMUSCULAR; INTRAVENOUS; SUBCUTANEOUS at 21:24

## 2023-07-04 RX ADMIN — ATOVAQUONE 750 MILLIGRAM(S): 750 SUSPENSION ORAL at 09:49

## 2023-07-04 RX ADMIN — ATORVASTATIN CALCIUM 80 MILLIGRAM(S): 80 TABLET, FILM COATED ORAL at 21:26

## 2023-07-04 RX ADMIN — AZITHROMYCIN 255 MILLIGRAM(S): 500 TABLET, FILM COATED ORAL at 09:57

## 2023-07-04 RX ADMIN — Medication 25 MILLIGRAM(S): at 21:25

## 2023-07-04 RX ADMIN — Medication 650 MILLIGRAM(S): at 21:29

## 2023-07-04 RX ADMIN — Medication 25 MILLIGRAM(S): at 09:50

## 2023-07-04 RX ADMIN — ATOVAQUONE 750 MILLIGRAM(S): 750 SUSPENSION ORAL at 21:25

## 2023-07-04 RX ADMIN — RALOXIFENE HYDROCHLORIDE 60 MILLIGRAM(S): 60 TABLET, COATED ORAL at 09:43

## 2023-07-04 RX ADMIN — Medication 100 MILLIGRAM(S): at 05:39

## 2023-07-04 NOTE — PROVIDER CONTACT NOTE (OTHER) - BACKGROUND
admitted for weakness, fatigue, dizziness, near-syncope, L sided abd pain, fever. on PO abx. undergoing additional testing for Lyme, babesia, ehrlichia PCR, WNV, RMSF, EBV, CRP, LDH. ID in on pt.

## 2023-07-04 NOTE — PROGRESS NOTE ADULT - SUBJECTIVE AND OBJECTIVE BOX
Date of service: 07-04-23 @ 10:44    Sitting in bed in NAD  Events noted   Noted with positive serology  Fever is down  Feels weak    ROS: no fever or chills; denies dizziness, no HA, no SOB or cough, no abdominal pain, no diarrhea or constipation; no dysuria, no legs pain, no rashes    MEDICATIONS  (STANDING):  atorvastatin 80 milliGRAM(s) Oral at bedtime  atovaquone  Suspension 750 milliGRAM(s) Oral every 12 hours  azithromycin  IVPB      metoprolol succinate ER 25 milliGRAM(s) Oral daily  raloxifene 60 milliGRAM(s) Oral daily  sodium chloride 0.9%. 1000 milliLiter(s) (75 mL/Hr) IV Continuous <Continuous>  traZODone 25 milliGRAM(s) Oral at bedtime    Vital Signs Last 24 Hrs  T(C): 37.2 (04 Jul 2023 08:39), Max: 37.2 (04 Jul 2023 08:39)  T(F): 98.9 (04 Jul 2023 08:39), Max: 98.9 (04 Jul 2023 08:39)  HR: 77 (04 Jul 2023 08:39) (77 - 87)  BP: 110/67 (04 Jul 2023 08:39) (110/67 - 132/78)  BP(mean): 81 (03 Jul 2023 15:54) (81 - 81)  RR: 18 (04 Jul 2023 08:39) (18 - 20)  SpO2: 93% (04 Jul 2023 08:39) (93% - 97%)    Parameters below as of 04 Jul 2023 08:39  Patient On (Oxygen Delivery Method): nasal cannula     Physical exam:    Constitutional:  No acute distress  HEENT: NC/AT, EOMI, PERRLA, conjunctivae clear; ears and nose atraumatic  Neck: supple; thyroid not palpable  Back: no tenderness  Respiratory: respiratory effort normal; clear to auscultation  Cardiovascular: S1S2 regular, no murmurs  Abdomen: soft, not tender, not distended, positive BS; HSM  Genitourinary: no suprapubic tenderness  Lymphatic: no LN palpable  Musculoskeletal: no muscle tenderness, no joint swelling or tenderness  Extremities: no pedal edema  Neurological/ Psychiatric: AxOx3, judgement and insight normal; moving all extremities  Skin: no rashes; no palpable lesions    Labs: reviewed                        9.9    4.24  )-----------( 118      ( 04 Jul 2023 07:20 )             29.7     07-04    140  |  112<H>  |  10  ----------------------------<  121<H>  3.8   |  22  |  0.75    Ca    8.2<L>      04 Jul 2023 07:20    TPro  6.0  /  Alb  2.3<L>  /  TBili  0.7  /  DBili  x   /  AST  43<H>  /  ALT  52  /  AlkPhos  90  07-04    Ferritin: 678 ng/mL (07-03-23 @ 16:48)  C-Reactive Protein, Serum: 52 mg/L (07-03-23 @ 16:48)  D-Dimer Assay, Quantitative: 2392 ng/mL DDU (07-03-23 @ 08:06)                        10.0   3.87  )-----------( 100      ( 03 Jul 2023 06:56 )             31.1     07-03    140  |  114<H>  |  13  ----------------------------<  143<H>  3.8   |  23  |  0.87    Ca    8.3<L>      03 Jul 2023 06:56    TPro  6.0  /  Alb  2.5<L>  /  TBili  0.8  /  DBili  x   /  AST  42<H>  /  ALT  51  /  AlkPhos  91  07-03     LIVER FUNCTIONS - ( 03 Jul 2023 06:56 )  Alb: 2.5 g/dL / Pro: 6.0 gm/dL / ALK PHOS: 91 U/L / ALT: 51 U/L / AST: 42 U/L / GGT: x           Urinalysis Basic - ( 03 Jul 2023 06:56 )    Color: x / Appearance: x / SG: x / pH: x  Gluc: 143 mg/dL / Ketone: x  / Bili: x / Urobili: x   Blood: x / Protein: x / Nitrite: x   Leuk Esterase: x / RBC: x / WBC x   Sq Epi: x / Non Sq Epi: x / Bacteria: x    (06-30 @ 19:30)  NotDetec    Culture - Blood (collected 30 Jun 2023 20:11)  Source: .Blood Blood/ Aerobic plus received  Preliminary Report (03 Jul 2023 02:02):    No growth at 48 Hours    Culture - Blood (collected 30 Jun 2023 20:11)  Source: .Blood Blood / Aerobic plus received  Preliminary Report (03 Jul 2023 02:02):    No growth at 48 Hours    Culture - Urine (collected 30 Jun 2023 19:30)  Source: Clean Catch Clean Catch (Midstream)  Final Report (02 Jul 2023 17:07):    <10,000 CFU/mL Normal Urogenital Sonal    Radiology: all available radiological tests reviewed    < from: CT Angio Chest PE Protocol w/ IV Cont (07.03.23 @ 10:36) >  1.  No pulmonary embolism.  2.  Trace left pleural effusion.    < end of copied text >  < from: CT Abdomen and Pelvis w/ IV Cont (06.30.23 @ 16:07) >  SPLEEN: Splenomegaly. Spleen measures 15 cm in length.  Scattered sigmoid diverticulosis without evidence of acute diverticulitis. Normal appendix.  Hepatosplenomegaly.  < end of copied text >      Advanced directives addressed: full resuscitation

## 2023-07-04 NOTE — PROGRESS NOTE ADULT - SUBJECTIVE AND OBJECTIVE BOX
HOSPITALIST ATTENDING PROGRESS NOTE    Chart and meds reviewed.  Patient seen and examined.    CC: Dizziness    Subjective: Patient experiencing chest pain, jaw pain and some sob. Still febrile    7/4:     All other systems reviewed and found to be negative with the exception of what has been described above.    MEDICATIONS  (STANDING):  atorvastatin 80 milliGRAM(s) Oral at bedtime  cefTRIAXone Injectable. 1000 milliGRAM(s) IV Push every 24 hours  cefTRIAXone Injectable.      metoprolol succinate ER 25 milliGRAM(s) Oral daily  raloxifene 60 milliGRAM(s) Oral daily  sodium chloride 0.9%. 1000 milliLiter(s) (75 mL/Hr) IV Continuous <Continuous>  traZODone 25 milliGRAM(s) Oral at bedtime    MEDICATIONS  (PRN):  acetaminophen     Tablet .. 650 milliGRAM(s) Oral every 6 hours PRN Temp greater or equal to 38C (100.4F), Mild Pain (1 - 3)      VITALS:  T(F): 97.7 (07-03-23 @ 10:54), Max: 100.6 (07-02-23 @ 17:02)  HR: 87 (07-03-23 @ 10:54) (78 - 97)  BP: 123/71 (07-03-23 @ 10:54) (112/67 - 128/68)  RR: 20 (07-03-23 @ 10:46) (18 - 20)  SpO2: 97% (07-03-23 @ 10:46) (87% - 100%)      PHYSICAL EXAM:  GEN: NAD  HEENT:  pupils equal and reactive, EOMI, no oropharyngeal lesions, erythema, exudates, oral thrush  NECK:   supple, no carotid bruits, no palpable lymph nodes, no thyromegaly  CV:  +S1, +S2, regular, no murmurs or rubs  RESP:   lungs clear to auscultation bilaterally, no wheezing, rales, rhonchi, good air entry bilaterally  BREAST:  not examined  GI:  abdomen soft, non-tender, non-distended, normal BS, no bruits, no abdominal masses, no palpable masses  RECTAL:  not examined  :  not examined  MSK:   normal muscle tone, no atrophy, no rigidity, no contractions  EXT:  no clubbing, no cyanosis, no edema, no calf pain, swelling or erythema  VASCULAR:  pulses equal and symmetric in the upper and lower extremities  NEURO:  AAOX3, no focal neurological deficits, follows all commands, able to move extremities spontaneously  SKIN:  no ulcers, lesions or rashes    LABS:                            10.0   3.87  )-----------( 100      ( 03 Jul 2023 06:56 )             31.1     07-03    140  |  114<H>  |  13  ----------------------------<  143<H>  3.8   |  23  |  0.87    Ca    8.3<L>      03 Jul 2023 06:56    TPro  6.0  /  Alb  2.5<L>  /  TBili  0.8  /  DBili  x   /  AST  42<H>  /  ALT  51  /  AlkPhos  91  07-03        LIVER FUNCTIONS - ( 03 Jul 2023 06:56 )  Alb: 2.5 g/dL / Pro: 6.0 gm/dL / ALK PHOS: 91 U/L / ALT: 51 U/L / AST: 42 U/L / GGT: x           Mono spot negative  D-Dimer 2856      Urinalysis Basic - ( 03 Jul 2023 06:56 )  Color: x / Appearance: x / SG: x / pH: x  Gluc: 143 mg/dL / Ketone: x  / Bili: x / Urobili: x   Blood: x / Protein: x / Nitrite: x   Leuk Esterase: x / RBC: x / WBC x   Sq Epi: x / Non Sq Epi: x / Bacteria: x< from:       CT Angio Chest PE Protocol w/ IV Cont (07.03.23 @ 10:36) >  IMPRESSION:    1.  No pulmonary embolism.  2.  Trace left pleural effusion.    --- End of Report ---      < end of copied text >  < from: CT Abdomen and Pelvis w/ IV Cont (06.30.23 @ 16:07) >  IMPRESSION:  No acute CT findings. No bowel obstruction or inflammation.    Scattered sigmoid diverticulosis without evidence of acute   diverticulitis. Normal appendix.      < end of copied text >  < from: CT Abdomen and Pelvis w/ IV Cont (06.30.23 @ 16:07) >  Hepatosplenomegaly.      < end of copied text >   HOSPITALIST ATTENDING PROGRESS NOTE    Chart and meds reviewed.  Patient seen and examined.    CC: Dizziness    Subjective: Patient experiencing chest pain, jaw pain and some sob. Still febrile    7/4: Spoke with patient and reviewed positive findings on labs.     All other systems reviewed and found to be negative with the exception of what has been described above.    MEDICATIONS  (STANDING):  atorvastatin 80 milliGRAM(s) Oral at bedtime  cefTRIAXone Injectable. 1000 milliGRAM(s) IV Push every 24 hours  cefTRIAXone Injectable.      metoprolol succinate ER 25 milliGRAM(s) Oral daily  raloxifene 60 milliGRAM(s) Oral daily  sodium chloride 0.9%. 1000 milliLiter(s) (75 mL/Hr) IV Continuous <Continuous>  traZODone 25 milliGRAM(s) Oral at bedtime    MEDICATIONS  (PRN):  acetaminophen     Tablet .. 650 milliGRAM(s) Oral every 6 hours PRN Temp greater or equal to 38C (100.4F), Mild Pain (1 - 3)    Vital Signs Last 24 Hrs  T(C): 36.6 (04 Jul 2023 15:50), Max: 37.2 (04 Jul 2023 08:39)  T(F): 97.9 (04 Jul 2023 15:50), Max: 98.9 (04 Jul 2023 08:39)  HR: 83 (04 Jul 2023 19:45) (77 - 84)  BP: 117/66 (04 Jul 2023 15:50) (110/67 - 132/78)  BP(mean): --  RR: 18 (04 Jul 2023 15:50) (18 - 18)  SpO2: 99% (04 Jul 2023 15:50) (93% - 99%)    Parameters below as of 04 Jul 2023 15:50  Patient On (Oxygen Delivery Method): nasal cannula      PHYSICAL EXAM:  GEN: NAD  HEENT:  pupils equal and reactive, EOMI, no oropharyngeal lesions, erythema, exudates, oral thrush  NECK:   supple, no carotid bruits, no palpable lymph nodes, no thyromegaly  CV:  +S1, +S2, regular, no murmurs or rubs  RESP:   lungs clear to auscultation bilaterally, no wheezing, rales, rhonchi, good air entry bilaterally  BREAST:  not examined  GI:  abdomen soft, non-tender, non-distended, normal BS, no bruits, no abdominal masses, no palpable masses  RECTAL:  not examined  :  not examined  MSK:   normal muscle tone, no atrophy, no rigidity, no contractions  EXT:  no clubbing, no cyanosis, no edema, no calf pain, swelling or erythema  VASCULAR:  pulses equal and symmetric in the upper and lower extremities  NEURO:  AAOX3, no focal neurological deficits, follows all commands, able to move extremities spontaneously  SKIN:  no ulcers, lesions or rashes    LABS:                          9.9    4.24  )-----------( 118      ( 04 Jul 2023 07:20 )             29.7   07-04    140  |  112<H>  |  10  ----------------------------<  121<H>  3.8   |  22  |  0.75    Ca    8.2<L>      04 Jul 2023 07:20    TPro  6.0  /  Alb  2.3<L>  /  TBili  0.7  /  DBili  x   /  AST  43<H>  /  ALT  52  /  AlkPhos  90  07-04     Lyme: equivocal  + IgM: EBV  + Babesia                        10.0   3.87  )-----------( 100      ( 03 Jul 2023 06:56 )             31.1     07-03    140  |  114<H>  |  13  ----------------------------<  143<H>  3.8   |  23  |  0.87    Ca    8.3<L>      03 Jul 2023 06:56    TPro  6.0  /  Alb  2.5<L>  /  TBili  0.8  /  DBili  x   /  AST  42<H>  /  ALT  51  /  AlkPhos  91  07-03        LIVER FUNCTIONS - ( 03 Jul 2023 06:56 )  Alb: 2.5 g/dL / Pro: 6.0 gm/dL / ALK PHOS: 91 U/L / ALT: 51 U/L / AST: 42 U/L / GGT: x           Mono spot negative  D-Dimer 2856      Urinalysis Basic - ( 03 Jul 2023 06:56 )  Color: x / Appearance: x / SG: x / pH: x  Gluc: 143 mg/dL / Ketone: x  / Bili: x / Urobili: x   Blood: x / Protein: x / Nitrite: x   Leuk Esterase: x / RBC: x / WBC x   Sq Epi: x / Non Sq Epi: x / Bacteria: x< from:       CT Angio Chest PE Protocol w/ IV Cont (07.03.23 @ 10:36) >  IMPRESSION:    1.  No pulmonary embolism.  2.  Trace left pleural effusion.    --- End of Report ---      < end of copied text >  < from: CT Abdomen and Pelvis w/ IV Cont (06.30.23 @ 16:07) >  IMPRESSION:  No acute CT findings. No bowel obstruction or inflammation.    Scattered sigmoid diverticulosis without evidence of acute   diverticulitis. Normal appendix.      < end of copied text >  < from: CT Abdomen and Pelvis w/ IV Cont (06.30.23 @ 16:07) >  Hepatosplenomegaly.      < end of copied text >

## 2023-07-04 NOTE — PROGRESS NOTE ADULT - ASSESSMENT
66 y/o female with PMHx of HTN, HLD who presented to [place] ED c/o weakness, fatigue, dizziness, near-syncope.      # Presyncope  - Likely due to viral syndrome and/or dehydration  - Repeat orthostatics in am  - Echo reviewed 7/1 EF >65 %, Normal appearing right ventricle structure and function.    - SP Fluid bolus in ED  - Cardiology consult appreciated  - Stable on monitor    # Hypoxia  - Likely due to atelectasis  - CTA negative for PE   - Incentive spirometer    # HSM  - Monospot negative  - With thrombocytopenia  - Elevated ESR  - Check Tick born disease  - Heme consult     # Abdominal pain  - Abd CT neg for acute pathology but with IV contrast  - ? Stone, check abd US    # Fever  - No obvious source, viral syndrome vs atelectasis?   - Work up neg so far  - Check Tick panel and EBV titers  - ID consult    # DVT prophylaxis  - Ambulation    # Disposition: Full code 66 y/o female with PMHx of HTN, HLD who presented to [place] ED c/o weakness, fatigue, dizziness, near-syncope.      #+Babesia   -Atovaquone and azithromycin    #+ EBV  -supportive care  -Monitor plts    #Thrombocytopenia  -Monitor plts    # Presyncope  - Likely due to viral syndrome and/or dehydration  - Repeat orthostatics in am  - Echo reviewed 7/1 EF >65 %, Normal appearing right ventricle structure and function.    - SP Fluid bolus in ED  - Cardiology consult appreciated  - Stable on monitor    # Hypoxia  - Likely due to atelectasis  - CTA negative for PE   - Incentive spirometer    # HSM  - Monospot negative  - With thrombocytopenia  - Elevated ESR  - Check Tick born disease, +babesia  - Heme consult     # Abdominal pain  - Abd CT neg for acute pathology but with IV contrast  - ? Stone, check abd US    # Fever  - No obvious source, viral syndrome vs atelectasis?   - Work up neg so far  - Check Tick panel and EBV titers  - ID consult    # DVT prophylaxis  - Ambulation    # Disposition: Full code

## 2023-07-04 NOTE — PROGRESS NOTE ADULT - ASSESSMENT
68 y/o female with h/o HTN, HLD was admitted on 7/3 for weakness, fatigue, dizziness, near-syncope.  Pt had a temperature of 99. F at home associated with chills, diaphoresis, headache for four days PTA. She reported left sided abdominal pain for nearly a month PTA associated with nausea, but no vomiting. In ER the patient was found dehydrated with mild hypoxia. SHe continued to have low grade fever. She received ceftriaxone.     1. Low grade fever. Babesiosis. Infectious mononucleosis. Thrombocytopenia. Splenomegaly. Possible tick related illness. Possible viral syndrome.  -EBV serology IgM high  -cultures noted  -Lyme AB are negative  -babesia PCR is detected  -EBV, CMV, ESR, CRP, LDH noted  -on doxycycline 100 mg PO q12h # 1-2  -tolerating abx well so far; no side effects noted  -change abx to atorvastatin 750 mg PO q12h and azithromycin 500 mg IV qd  -reason for abx use and side effects reviewed with patient; monitor BMP   -f/u serologies  -obtain parasite count  -old chart reviewed to assess prior cultures  -monitor temps  -f/u CBC  -supportive care  2. Other issues:   -care per medicine

## 2023-07-04 NOTE — PROGRESS NOTE ADULT - ASSESSMENT
66 y/o female with MH of HTN, HLD who presented to [place] ED c/o weakness, fatigue, dizziness, near-syncope, found to have thrombocytopenia.    # Thrombocytopenia- likely due to acute infection--> Babesia    -Platelets -118 K/ul<--100 K/ul<---119 K/ul, slowly improving and stable.   -most recent record from 6/30, no other records in EHR.   -Unclear, however etiology of thrombocytopenia possibly d/t decreased TPO production with portal HTN and splenic sequestration due to hepatosplenomegaly- as evident on CT-A/P-6/30  -Also Inflammation / infection vs drug-induced thrombocytopenia (non-immune) causes of acute decline in platelets. Other causes include immune-mediated platelet destruction/ ITP, bone marrow suppression, or platelet consumption as in DIC.   -Denies any new medications recently; no recent infection/antibiotic exposure. Denies any easy bruising, bleeding.   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 PCP- Dr. Casey Starks, who she saw -6-9 months ago.  -Noted to have UTI with pyuria--->UA-11-25/HPF;   -? plt consumption due to compensated DIC less likely, but d-dimers high--->PT/ INR slightly high, but aPTT & fibrinogen- normal, r/o DIC  -LDH.-high, likely reactive due to acute infection.,   -HIT unlikely - no recent heparin exposure.   -Folate, B12 and iron studies- normal  -Lyme AB are negative  -Monospot negative  -EBV serology IgM high  -Babesia PCR is detected  -Monitor serial CBC    # Presyncope    - CTA--> Negative for PE  - Likely due to viral syndrome and/or dehydration  - SP Fluid bolus in ED  - Cardiology following -->ECHO-normal EF-> 65%  - Stable on monitor    # Babesiosis, Infectious Mononucleosis, Possible Tick related illness, Possible Viral Syndrome.    -ID following  -On Doxycycline and Azithromycin   -CBC daily  -Supportive care

## 2023-07-04 NOTE — PROGRESS NOTE ADULT - SUBJECTIVE AND OBJECTIVE BOX
HPI:    66 y/o female with MH of HTN, HLD who presented to [facility] ED c/o weakness, fatigue, dizziness, near-syncope. Pt also had a temperature of 99. F at home with chills,  diaphoresis, headache for four days. She reported left sided abdominal pain  for nearly a month. She reports nausea and denies vomiting.      Pt reports she saw her cardiologist for some of her symptoms including weakness last month and had an echo which was reportedly  normal.  She states she had a neg nuclear stress test , two years ago. She has never had a colonoscopy.    7/3/2023- Seen at bedside, in no acute distress. Denies easy bruising bleeding, no hematuria, nose bleed, gum bleed or hematochezia     7/4/2023- Seen at bedside, in no acute distress.    PAST MEDICAL & SURGICAL HISTORY:    HTN (hypertension)  HLD (hyperlipidemia)  No significant past surgical history      FAMILY HISTORY:    dementia (Mother)    colon cancer (Father)      MEDICATIONS  (STANDING):    atorvastatin 80 milliGRAM(s) Oral at bedtime  atovaquone  Suspension 750 milliGRAM(s) Oral every 12 hours  azithromycin  IVPB      metoprolol succinate ER 25 milliGRAM(s) Oral daily  raloxifene 60 milliGRAM(s) Oral daily  sodium chloride 0.9%. 1000 milliLiter(s) (75 mL/Hr) IV Continuous <Continuous>  traZODone 25 milliGRAM(s) Oral at bedtime    MEDICATIONS  (PRN):  acetaminophen     Tablet .. 650 milliGRAM(s) Oral every 6 hours PRN Temp greater or equal to 38C (100.4F), Mild Pain (1 - 3)    Allergies    Augmentin (Vomiting; Nausea)      Review of Systems    Constitutional, Eyes, ENT, Cardiovascular, Respiratory, Gastrointestinal, Genitourinary, Musculoskeletal, Integumentary, Neurological, Psychiatric, Endocrine, Heme/Lymph and Allergic/Immunologic review of systems are otherwise negative except as noted in HPI.     Vital Signs Last 24 Hrs    Vital Signs Last 24 Hrs  T(C): 37.2 (04 Jul 2023 08:39), Max: 37.2 (04 Jul 2023 08:39)  T(F): 98.9 (04 Jul 2023 08:39), Max: 98.9 (04 Jul 2023 08:39)  HR: 77 (04 Jul 2023 08:39) (77 - 82)  BP: 110/67 (04 Jul 2023 08:39) (110/67 - 132/78)  BP(mean): 81 (03 Jul 2023 15:54) (81 - 81)  RR: 18 (04 Jul 2023 08:39) (18 - 18)  SpO2: 93% (04 Jul 2023 08:39) (93% - 94%)    Parameters below as of 04 Jul 2023 08:39  Patient On (Oxygen Delivery Method): nasal cannula  O2 Flow (L/min): 2    Physical Exam    Eyes: no conjunctival infection, anicteric.   ENT: pharynx is unremarkable, moist mucus membrane, no oral lesions.   Neck: supple without JVD, no thyromegaly or masses appreciated.   Pulmonary: clear to auscultation bilaterally, no dullness, no wheezing.   Cardiac: RRR, normal S1S2, no murmurs, rubs, gallops.   Vascular: no JVD, no calf tenderness, venous stasis changes, varices.   Abdomen: normoactive bowel sounds, soft and nontender, no hepatosplenomegaly or masses appreciated.   Lymphatic: no peripheral adenopathy appreciated.   Musculoskeletal: full range of motion and no deformities appreciated.   Skin: normal appearance, no rash, nodules, vesicles, ulcers, erythema.   Neurology: grossly intact.   Psychiatric: affect appropriate.     LABS:                          9.9    4.24  )-----------( 118      ( 04 Jul 2023 07:20 )             29.7     07-04    140  |  112<H>  |  10  ----------------------------<  121<H>  3.8   |  22  |  0.75    Ca    8.2<L>      04 Jul 2023 07:20    TPro  6.0  /  Alb  2.3<L>  /  TBili  0.7  /  DBili  x   /  AST  43<H>  /  ALT  52  /  AlkPhos  90  07-04    PT/INR - ( 03 Jul 2023 16:48 )   PT: 14.2 sec;   INR: 1.22 ratio    PTT - ( 03 Jul 2023 16:48 )  PTT:30.3 sec  v-[id number]-[id number] ng/ml  LDH-445 U/L  Fibrogen Assay-391 mg/dl    Urinalysis Basic - ( 03 Jul 2023 06:56 )    Color: x / Appearance: x / SG: x / pH: x  Gluc: 143 mg/dL / Ketone: x  / Bili: x / Urobili: x   Blood: x / Protein: x / Nitrite: x   Leuk Esterase: x / RBC: x / WBC x   Sq Epi: x / Non Sq Epi: x / Bacteria: x    RADIOLOGY & ADDITIONAL STUDIES:    CT Angio Chest PE Protocol w/ IV Cont (07.03.23)    INTERPRETATION:  INDICATION: Hypoxic, short of breath, and chest pain.   Assess for pulmonary embolism.    TECHNIQUE: Helical acquisition of the chest after the administration of   60 mL of Omnipaque 350. Maximum intensity projection images were   generated.    COMPARISON: Chest radiograph dated 6/30/2023, CT abdomen and pelvis dated   6/30/2023..    FINDINGS:    PULMONARY ANGIOGRAM:  No pulmonary embolism.    LUNGS/AIRWAYS/PLEURA: Patent central airways. Emphysema. Trace left   pleural effusion. Bilateral lower lobe atelectasis. Elevated left   hemidiaphragm.    LYMPH NODES/MEDIASTINUM: 2.2 cm right thyroid nodule. No lymphadenopathy.    HEART/VASCULATURE: Heart size is within normal limits. Coronary artery   calcifications. Atherosclerotic disease of aorta.    UPPER ABDOMEN: Small hiatal hernia. Splenomegaly.    BONES/SOFT TISSUES: Degenerative changes.      IMPRESSION:    1.  No pulmonary embolism.  2.  Trace left pleural effusion.      EXAM:  CT ABDOMEN AND PELVIS IC    PROCEDURE DATE:  06/30/2023      INTERPRETATION:  CLINICAL INFORMATION: Left lower quadrant pain    COMPARISON: None.    CONTRAST/COMPLICATIONS:  IV Contrast: Omnipaque 350  90 cc administered   10 cc discarded  Oral Contrast: NONE  Complications: None reported at time of study completion    PROCEDURE:  CT of the Abdomen and Pelvis was performed.  Sagittal and coronal reformats were performed.    FINDINGS:  LOWER CHEST: Within normal limits.    LIVER: Liver is enlarged and measures 18 cm in length.  BILE DUCTS: Normal caliber.  GALLBLADDER: Within normal limits.  SPLEEN: Splenomegaly. Spleen measures 15 cm in length.  PANCREAS: Within normal limits.  ADRENALS: Within normal limits.  KIDNEYS/URETERS: Within normal limits.    BLADDER: Within normal limits.  REPRODUCTIVE ORGANS: Uterus and adnexa within normal limits.    BOWEL: No bowel obstruction. Moderate hiatal hernia. Normal appendix.   Scattered sigmoid diverticulosis without evidence of acute diverticulitis.  PERITONEUM: No ascites.  VESSELS: Atherosclerotic changes. The abdominal aorta is ectatic   measuring up to 2.9 cm in maximal axial diameter.  RETROPERITONEUM/LYMPH NODES: No lymphadenopathy.  ABDOMINAL WALL: Within normal limits.  BONES: Degenerative changes.    IMPRESSION:  No acute CT findings. No bowel obstruction or inflammation.    Scattered sigmoid diverticulosis without evidence of acute   diverticulitis. Normal appendix.    Hepatosplenomegaly.

## 2023-07-05 LAB
ALBUMIN SERPL ELPH-MCNC: 2.5 G/DL — LOW (ref 3.3–5)
ALP SERPL-CCNC: 92 U/L — SIGNIFICANT CHANGE UP (ref 40–120)
ALT FLD-CCNC: 65 U/L — SIGNIFICANT CHANGE UP (ref 12–78)
ANION GAP SERPL CALC-SCNC: 3 MMOL/L — LOW (ref 5–17)
AST SERPL-CCNC: 44 U/L — HIGH (ref 15–37)
BASOPHILS # BLD AUTO: 0 K/UL — SIGNIFICANT CHANGE UP (ref 0–0.2)
BASOPHILS NFR BLD AUTO: 0 % — SIGNIFICANT CHANGE UP (ref 0–2)
BILIRUB SERPL-MCNC: 0.7 MG/DL — SIGNIFICANT CHANGE UP (ref 0.2–1.2)
BUN SERPL-MCNC: 10 MG/DL — SIGNIFICANT CHANGE UP (ref 7–23)
CALCIUM SERPL-MCNC: 8.6 MG/DL — SIGNIFICANT CHANGE UP (ref 8.5–10.1)
CHLORIDE SERPL-SCNC: 114 MMOL/L — HIGH (ref 96–108)
CO2 SERPL-SCNC: 26 MMOL/L — SIGNIFICANT CHANGE UP (ref 22–31)
CREAT SERPL-MCNC: 0.66 MG/DL — SIGNIFICANT CHANGE UP (ref 0.5–1.3)
CULTURE RESULTS: SIGNIFICANT CHANGE UP
EGFR: 95 ML/MIN/1.73M2 — SIGNIFICANT CHANGE UP
EOSINOPHIL # BLD AUTO: 0.04 K/UL — SIGNIFICANT CHANGE UP (ref 0–0.5)
EOSINOPHIL NFR BLD AUTO: 1 % — SIGNIFICANT CHANGE UP (ref 0–6)
GLUCOSE SERPL-MCNC: 105 MG/DL — HIGH (ref 70–99)
HCT VFR BLD CALC: 31.7 % — LOW (ref 34.5–45)
HGB BLD-MCNC: 10.4 G/DL — LOW (ref 11.5–15.5)
LYMPHOCYTES # BLD AUTO: 1.62 K/UL — SIGNIFICANT CHANGE UP (ref 1–3.3)
LYMPHOCYTES # BLD AUTO: 43 % — SIGNIFICANT CHANGE UP (ref 13–44)
MANUAL SMEAR VERIFICATION: SIGNIFICANT CHANGE UP
MCHC RBC-ENTMCNC: 29.4 PG — SIGNIFICANT CHANGE UP (ref 27–34)
MCHC RBC-ENTMCNC: 32.8 GM/DL — SIGNIFICANT CHANGE UP (ref 32–36)
MCV RBC AUTO: 89.5 FL — SIGNIFICANT CHANGE UP (ref 80–100)
METAMYELOCYTES # FLD: 1 % — HIGH (ref 0–0)
MONOCYTES # BLD AUTO: 0.3 K/UL — SIGNIFICANT CHANGE UP (ref 0–0.9)
MONOCYTES NFR BLD AUTO: 8 % — SIGNIFICANT CHANGE UP (ref 2–14)
NEUTROPHILS # BLD AUTO: 1.73 K/UL — LOW (ref 1.8–7.4)
NEUTROPHILS NFR BLD AUTO: 46 % — SIGNIFICANT CHANGE UP (ref 43–77)
NRBC # BLD: 0 /100 — SIGNIFICANT CHANGE UP (ref 0–0)
NRBC # BLD: SIGNIFICANT CHANGE UP /100 WBCS (ref 0–0)
PLAT MORPH BLD: NORMAL — SIGNIFICANT CHANGE UP
PLATELET # BLD AUTO: 156 K/UL — SIGNIFICANT CHANGE UP (ref 150–400)
POTASSIUM SERPL-MCNC: 3.9 MMOL/L — SIGNIFICANT CHANGE UP (ref 3.5–5.3)
POTASSIUM SERPL-SCNC: 3.9 MMOL/L — SIGNIFICANT CHANGE UP (ref 3.5–5.3)
PROT SERPL-MCNC: 6 GM/DL — SIGNIFICANT CHANGE UP (ref 6–8.3)
RBC # BLD: 3.54 M/UL — LOW (ref 3.8–5.2)
RBC # FLD: 15.4 % — HIGH (ref 10.3–14.5)
RBC BLD AUTO: NORMAL — SIGNIFICANT CHANGE UP
SODIUM SERPL-SCNC: 143 MMOL/L — SIGNIFICANT CHANGE UP (ref 135–145)
SPECIMEN SOURCE: SIGNIFICANT CHANGE UP
VARIANT LYMPHS # BLD: 1 % — SIGNIFICANT CHANGE UP (ref 0–6)
WBC # BLD: 3.77 K/UL — LOW (ref 3.8–10.5)
WBC # FLD AUTO: 3.77 K/UL — LOW (ref 3.8–10.5)

## 2023-07-05 PROCEDURE — 99233 SBSQ HOSP IP/OBS HIGH 50: CPT

## 2023-07-05 RX ORDER — ACETAMINOPHEN 500 MG
1000 TABLET ORAL EVERY 8 HOURS
Refills: 0 | Status: DISCONTINUED | OUTPATIENT
Start: 2023-07-05 | End: 2023-07-06

## 2023-07-05 RX ADMIN — Medication 25 MILLIGRAM(S): at 22:38

## 2023-07-05 RX ADMIN — ATOVAQUONE 750 MILLIGRAM(S): 750 SUSPENSION ORAL at 22:39

## 2023-07-05 RX ADMIN — Medication 25 MILLIGRAM(S): at 09:36

## 2023-07-05 RX ADMIN — Medication 650 MILLIGRAM(S): at 05:09

## 2023-07-05 RX ADMIN — ATORVASTATIN CALCIUM 80 MILLIGRAM(S): 80 TABLET, FILM COATED ORAL at 22:38

## 2023-07-05 RX ADMIN — ATOVAQUONE 750 MILLIGRAM(S): 750 SUSPENSION ORAL at 09:35

## 2023-07-05 RX ADMIN — AZITHROMYCIN 255 MILLIGRAM(S): 500 TABLET, FILM COATED ORAL at 09:36

## 2023-07-05 RX ADMIN — RALOXIFENE HYDROCHLORIDE 60 MILLIGRAM(S): 60 TABLET, COATED ORAL at 09:36

## 2023-07-05 RX ADMIN — Medication 650 MILLIGRAM(S): at 18:58

## 2023-07-05 RX ADMIN — Medication 300 MILLIGRAM(S): at 12:41

## 2023-07-05 RX ADMIN — Medication 650 MILLIGRAM(S): at 04:39

## 2023-07-05 NOTE — PROGRESS NOTE ADULT - SUBJECTIVE AND OBJECTIVE BOX
HOSPITALIST ATTENDING PROGRESS NOTE    Chart and meds reviewed.  Patient seen and examined.    CC/ HPI Patient is a 67y old  Female who presents with a chief complaint of Pre-syncope   Vtach  5 beat on ED telemetry  Abd pain (05 Jul 2023 11:04)      Subjective: Patient seen and examined at bedside. c/o left lower quadrant abdominal pain    All other systems reviewed and found to be negative with the exception of what has been described above.    MEDICATIONS:  MEDICATIONS  (STANDING):  atorvastatin 80 milliGRAM(s) Oral at bedtime  atovaquone  Suspension 750 milliGRAM(s) Oral every 12 hours  azithromycin  IVPB      azithromycin  IVPB 500 milliGRAM(s) IV Intermittent every 24 hours  metoprolol succinate ER 25 milliGRAM(s) Oral daily  raloxifene 60 milliGRAM(s) Oral daily  sodium chloride 0.9%. 1000 milliLiter(s) (75 mL/Hr) IV Continuous <Continuous>  traZODone 25 milliGRAM(s) Oral at bedtime      Vital Signs Last 24 Hrs  T(C): 36.4 (05 Jul 2023 15:32), Max: 36.4 (05 Jul 2023 05:25)  T(F): 97.5 (05 Jul 2023 15:32), Max: 97.5 (05 Jul 2023 05:25)  HR: 70 (05 Jul 2023 15:32) (66 - 70)  BP: 101/68 (05 Jul 2023 15:32) (100/61 - 113/69)  BP(mean): 79 (05 Jul 2023 15:32) (79 - 79)  RR: 18 (05 Jul 2023 15:32) (18 - 18)  SpO2: 96% (05 Jul 2023 15:32) (83% - 96%)    Parameters below as of 05 Jul 2023 15:32  Patient On (Oxygen Delivery Method): nasal cannula        I&O's Summary      CAPILLARY BLOOD GLUCOSE          PHYSICAL EXAM:    Constitutional: NAD, awake and alert, well-developed  HEENT: PERR, EOMI, Normal Hearing, MMM  Neck: Soft and supple, No LAD, No JVD  Respiratory: Breath sounds are clear bilaterally, No wheezing, rales or rhonchi  Cardiovascular: S1 and S2, regular rate and rhythm, no Murmurs, gallops or rubs  Gastrointestinal: Bowel Sounds present, soft, nontender, nondistended, no guarding, no rebound  Extremities: No peripheral edema  Vascular: 2+ peripheral pulses  Neurological: A/O x 3, no focal deficits  Musculoskeletal: 5/5 strength b/l upper and lower extremities  Skin: No rashes        LABS: All Labs Reviewed:                        10.4   3.77  )-----------( 156      ( 05 Jul 2023 07:53 )             31.7     07-05    143  |  114<H>  |  10  ----------------------------<  105<H>  3.9   |  26  |  0.66    Ca    8.6      05 Jul 2023 07:53    TPro  6.0  /  Alb  2.5<L>  /  TBili  0.7  /  DBili  x   /  AST  44<H>  /  ALT  65  /  AlkPhos  92  07-05          Blood Culture: 07-05 @ 07:53  Organism --  Gram Stain Blood -- Gram Stain --  Specimen Source .Blood None  Culture-Blood --        RADIOLOGY/EKG:    DVT PPX:    ADVANCED DIRECTIVE:    DISPOSITION:

## 2023-07-05 NOTE — PROGRESS NOTE ADULT - SUBJECTIVE AND OBJECTIVE BOX
HPI:    68 y/o female with MH of HTN, HLD who presented to [facility] ED c/o weakness, fatigue, dizziness, near-syncope. Pt also had a temperature of 99. F at home with chills,  diaphoresis, headache for four days. She reported left sided abdominal pain  for nearly a month. She reports nausea and denies vomiting.      Pt reports she saw her cardiologist for some of her symptoms including weakness last month and had an echo which was reportedly  normal.  She states she had a neg nuclear stress test , two years ago. She has never had a colonoscopy.    7/3/2023- Seen at bedside, in no acute distress. Denies easy bruising bleeding, no hematuria, nose bleed, gum bleed or hematochezia     7/4/2023- Seen at bedside, in no acute distress.    07/05/2023:      PAST MEDICAL & SURGICAL HISTORY:    HTN (hypertension)  HLD (hyperlipidemia)  No significant past surgical history      FAMILY HISTORY:    dementia (Mother)    colon cancer (Father)      MEDICATIONS  (STANDING):    atorvastatin 80 milliGRAM(s) Oral at bedtime  atovaquone  Suspension 750 milliGRAM(s) Oral every 12 hours  azithromycin  IVPB      azithromycin  IVPB 500 milliGRAM(s) IV Intermittent every 24 hours  metoprolol succinate ER 25 milliGRAM(s) Oral daily  raloxifene 60 milliGRAM(s) Oral daily  sodium chloride 0.9%. 1000 milliLiter(s) (75 mL/Hr) IV Continuous <Continuous>  traZODone 25 milliGRAM(s) Oral at bedtime      MEDICATIONS  (PRN):    acetaminophen     Tablet .. 650 milliGRAM(s) Oral every 6 hours PRN Temp greater or equal to 38C (100.4F), Mild Pain (1 - 3)        ALLERGIES:    Augmentin (Vomiting; Nausea)      REVIEW OF SYSTEMS:    Constitutional, Eyes, ENT, Cardiovascular, Respiratory, Gastrointestinal, Genitourinary, Musculoskeletal, Integumentary, Neurological, Psychiatric, Endocrine, Heme/Lymph and Allergic/Immunologic review of systems are otherwise negative except as noted in HPI.       VITALS:    ICU Vital Signs Last 24 Hrs  T(C): 36.4 (05 Jul 2023 08:30), Max: 36.7 (04 Jul 2023 20:55)  T(F): 97.5 (05 Jul 2023 08:30), Max: 98.1 (04 Jul 2023 20:55)  HR: 66 (05 Jul 2023 08:30) (66 - 84)  BP: 100/61 (05 Jul 2023 08:30) (100/61 - 117/66)  BP(mean): --  ABP: --  ABP(mean): --  RR: 18 (05 Jul 2023 08:30) (18 - 18)  SpO2: 92% (05 Jul 2023 08:30) (83% - 99%)    O2 Parameters below as of 05 Jul 2023 08:30  Patient On (Oxygen Delivery Method): nasal cannula  O2 Flow (L/min): 2      PHYSICAL:    Constitutional: no acute distress  Eyes: no conjunctival infection, anicteric.   ENT: pharynx is unremarkable, moist mucus membrane, no oral lesions.   Neck: supple without JVD, no thyromegaly or masses appreciated.   Pulmonary: clear to auscultation bilaterally, no dullness, no wheezing.   Cardiac: RRR, normal S1S2, no murmurs, rubs, gallops.   Vascular: no JVD, no calf tenderness, venous stasis changes, varices.   Abdomen: normoactive bowel sounds, soft and nontender, no hepatosplenomegaly or masses appreciated.   Lymphatic: no peripheral adenopathy appreciated.   Musculoskeletal: full range of motion and no deformities appreciated.   Skin: normal appearance, no rash, nodules, vesicles, ulcers, erythema.   Neurology: grossly intact.   Psychiatric: affect appropriate.       LABS:                                     10.4   3.77  )-----------( 156      ( 05 Jul 2023 07:53 )             31.7     07-05    143  |  114<H>  |  10  ----------------------------<  105<H>  3.9   |  26  |  0.66    Ca    8.6      05 Jul 2023 07:53    TPro  6.0  /  Alb  2.5<L>  /  TBili  0.7  /  DBili  x   /  AST  44<H>  /  ALT  65  /  AlkPhos  92  07-05                    RADIOLOGY & ADDITIONAL STUDIES:    CT Angio Chest PE Protocol w/ IV Cont (07.03.23)    INTERPRETATION:  INDICATION: Hypoxic, short of breath, and chest pain.   Assess for pulmonary embolism.    TECHNIQUE: Helical acquisition of the chest after the administration of   60 mL of Omnipaque 350. Maximum intensity projection images were   generated.    COMPARISON: Chest radiograph dated 6/30/2023, CT abdomen and pelvis dated   6/30/2023..    FINDINGS:    PULMONARY ANGIOGRAM:  No pulmonary embolism.    LUNGS/AIRWAYS/PLEURA: Patent central airways. Emphysema. Trace left   pleural effusion. Bilateral lower lobe atelectasis. Elevated left   hemidiaphragm.    LYMPH NODES/MEDIASTINUM: 2.2 cm right thyroid nodule. No lymphadenopathy.    HEART/VASCULATURE: Heart size is within normal limits. Coronary artery   calcifications. Atherosclerotic disease of aorta.    UPPER ABDOMEN: Small hiatal hernia. Splenomegaly.    BONES/SOFT TISSUES: Degenerative changes.      IMPRESSION:    1.  No pulmonary embolism.  2.  Trace left pleural effusion.      EXAM:  CT ABDOMEN AND PELVIS IC    PROCEDURE DATE:  06/30/2023      INTERPRETATION:  CLINICAL INFORMATION: Left lower quadrant pain    COMPARISON: None.    CONTRAST/COMPLICATIONS:  IV Contrast: Omnipaque 350  90 cc administered   10 cc discarded  Oral Contrast: NONE  Complications: None reported at time of study completion    PROCEDURE:  CT of the Abdomen and Pelvis was performed.  Sagittal and coronal reformats were performed.    FINDINGS:  LOWER CHEST: Within normal limits.    LIVER: Liver is enlarged and measures 18 cm in length.  BILE DUCTS: Normal caliber.  GALLBLADDER: Within normal limits.  SPLEEN: Splenomegaly. Spleen measures 15 cm in length.  PANCREAS: Within normal limits.  ADRENALS: Within normal limits.  KIDNEYS/URETERS: Within normal limits.    BLADDER: Within normal limits.  REPRODUCTIVE ORGANS: Uterus and adnexa within normal limits.    BOWEL: No bowel obstruction. Moderate hiatal hernia. Normal appendix.   Scattered sigmoid diverticulosis without evidence of acute diverticulitis.  PERITONEUM: No ascites.  VESSELS: Atherosclerotic changes. The abdominal aorta is ectatic   measuring up to 2.9 cm in maximal axial diameter.  RETROPERITONEUM/LYMPH NODES: No lymphadenopathy.  ABDOMINAL WALL: Within normal limits.  BONES: Degenerative changes.    IMPRESSION:  No acute CT findings. No bowel obstruction or inflammation.    Scattered sigmoid diverticulosis without evidence of acute   diverticulitis. Normal appendix.    Hepatosplenomegaly.         HPI:    68 y/o female with MH of HTN, HLD who presented to [facility] ED c/o weakness, fatigue, dizziness, near-syncope. Pt also had a temperature of 99. F at home with chills,  diaphoresis, headache for four days. She reported left sided abdominal pain  for nearly a month. She reports nausea and denies vomiting.      Pt reports she saw her cardiologist for some of her symptoms including weakness last month and had an echo which was reportedly  normal.  She states she had a neg nuclear stress test , two years ago. She has never had a colonoscopy.    7/3/2023- Seen at bedside, in no acute distress. Denies easy bruising bleeding, no hematuria, nose bleed, gum bleed or hematochezia     7/4/2023- Seen at bedside, in no acute distress.    07/05/2023: Seen at bedside, feeling better, no acute distress       PAST MEDICAL & SURGICAL HISTORY:    HTN (hypertension)  HLD (hyperlipidemia)  No significant past surgical history      FAMILY HISTORY:    dementia (Mother)    colon cancer (Father)      MEDICATIONS  (STANDING):    atorvastatin 80 milliGRAM(s) Oral at bedtime  atovaquone  Suspension 750 milliGRAM(s) Oral every 12 hours  azithromycin  IVPB      azithromycin  IVPB 500 milliGRAM(s) IV Intermittent every 24 hours  metoprolol succinate ER 25 milliGRAM(s) Oral daily  raloxifene 60 milliGRAM(s) Oral daily  sodium chloride 0.9%. 1000 milliLiter(s) (75 mL/Hr) IV Continuous <Continuous>  traZODone 25 milliGRAM(s) Oral at bedtime      MEDICATIONS  (PRN):    acetaminophen     Tablet .. 650 milliGRAM(s) Oral every 6 hours PRN Temp greater or equal to 38C (100.4F), Mild Pain (1 - 3)        ALLERGIES:    Augmentin (Vomiting; Nausea)      REVIEW OF SYSTEMS:    Constitutional, Eyes, ENT, Cardiovascular, Respiratory, Gastrointestinal, Genitourinary, Musculoskeletal, Integumentary, Neurological, Psychiatric, Endocrine, Heme/Lymph and Allergic/Immunologic review of systems are otherwise negative except as noted in HPI.       VITALS:    ICU Vital Signs Last 24 Hrs  T(C): 36.4 (05 Jul 2023 08:30), Max: 36.7 (04 Jul 2023 20:55)  T(F): 97.5 (05 Jul 2023 08:30), Max: 98.1 (04 Jul 2023 20:55)  HR: 66 (05 Jul 2023 08:30) (66 - 84)  BP: 100/61 (05 Jul 2023 08:30) (100/61 - 117/66)  BP(mean): --  ABP: --  ABP(mean): --  RR: 18 (05 Jul 2023 08:30) (18 - 18)  SpO2: 92% (05 Jul 2023 08:30) (83% - 99%)    O2 Parameters below as of 05 Jul 2023 08:30  Patient On (Oxygen Delivery Method): nasal cannula  O2 Flow (L/min): 2      PHYSICAL:    Constitutional: no acute distress  Eyes: no conjunctival infection, anicteric.   ENT: pharynx is unremarkable, moist mucus membrane, no oral lesions.   Neck: supple without JVD, no thyromegaly or masses appreciated.   Pulmonary: clear to auscultation bilaterally, no dullness, no wheezing.   Cardiac: RRR, normal S1S2, no murmurs, rubs, gallops.   Vascular: no JVD, no calf tenderness, venous stasis changes, varices.   Abdomen: normoactive bowel sounds, soft and nontender, no hepatosplenomegaly or masses appreciated.   Lymphatic: no peripheral adenopathy appreciated.   Musculoskeletal: full range of motion and no deformities appreciated.   Skin: normal appearance, no rash, nodules, vesicles, ulcers, erythema.   Neurology: grossly intact.   Psychiatric: affect appropriate.       LABS:                                     10.4   3.77  )-----------( 156      ( 05 Jul 2023 07:53 )             31.7     07-05    143  |  114<H>  |  10  ----------------------------<  105<H>  3.9   |  26  |  0.66    Ca    8.6      05 Jul 2023 07:53    TPro  6.0  /  Alb  2.5<L>  /  TBili  0.7  /  DBili  x   /  AST  44<H>  /  ALT  65  /  AlkPhos  92  07-05                    RADIOLOGY & ADDITIONAL STUDIES:    CT Angio Chest PE Protocol w/ IV Cont (07.03.23)    INTERPRETATION:  INDICATION: Hypoxic, short of breath, and chest pain.   Assess for pulmonary embolism.    TECHNIQUE: Helical acquisition of the chest after the administration of   60 mL of Omnipaque 350. Maximum intensity projection images were   generated.    COMPARISON: Chest radiograph dated 6/30/2023, CT abdomen and pelvis dated   6/30/2023..    FINDINGS:    PULMONARY ANGIOGRAM:  No pulmonary embolism.    LUNGS/AIRWAYS/PLEURA: Patent central airways. Emphysema. Trace left   pleural effusion. Bilateral lower lobe atelectasis. Elevated left   hemidiaphragm.    LYMPH NODES/MEDIASTINUM: 2.2 cm right thyroid nodule. No lymphadenopathy.    HEART/VASCULATURE: Heart size is within normal limits. Coronary artery   calcifications. Atherosclerotic disease of aorta.    UPPER ABDOMEN: Small hiatal hernia. Splenomegaly.    BONES/SOFT TISSUES: Degenerative changes.      IMPRESSION:    1.  No pulmonary embolism.  2.  Trace left pleural effusion.      EXAM:  CT ABDOMEN AND PELVIS IC    PROCEDURE DATE:  06/30/2023      INTERPRETATION:  CLINICAL INFORMATION: Left lower quadrant pain    COMPARISON: None.    CONTRAST/COMPLICATIONS:  IV Contrast: Omnipaque 350  90 cc administered   10 cc discarded  Oral Contrast: NONE  Complications: None reported at time of study completion    PROCEDURE:  CT of the Abdomen and Pelvis was performed.  Sagittal and coronal reformats were performed.    FINDINGS:  LOWER CHEST: Within normal limits.    LIVER: Liver is enlarged and measures 18 cm in length.  BILE DUCTS: Normal caliber.  GALLBLADDER: Within normal limits.  SPLEEN: Splenomegaly. Spleen measures 15 cm in length.  PANCREAS: Within normal limits.  ADRENALS: Within normal limits.  KIDNEYS/URETERS: Within normal limits.    BLADDER: Within normal limits.  REPRODUCTIVE ORGANS: Uterus and adnexa within normal limits.    BOWEL: No bowel obstruction. Moderate hiatal hernia. Normal appendix.   Scattered sigmoid diverticulosis without evidence of acute diverticulitis.  PERITONEUM: No ascites.  VESSELS: Atherosclerotic changes. The abdominal aorta is ectatic   measuring up to 2.9 cm in maximal axial diameter.  RETROPERITONEUM/LYMPH NODES: No lymphadenopathy.  ABDOMINAL WALL: Within normal limits.  BONES: Degenerative changes.    IMPRESSION:  No acute CT findings. No bowel obstruction or inflammation.    Scattered sigmoid diverticulosis without evidence of acute   diverticulitis. Normal appendix.    Hepatosplenomegaly.

## 2023-07-05 NOTE — PROGRESS NOTE ADULT - ASSESSMENT
66 y/o female with MH of HTN, HLD who presented to [place] ED c/o weakness, fatigue, dizziness, near-syncope, found to have thrombocytopenia.    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, for which she never followed; PCP is Dr Casey Starks, most recently seen 6 - 9 months ago  - Evidence of UTI  - Questionable consumption due to compensated DIC less likely, but DDimer is high 2,000+ (CTA chest negative for PE) ---> PT/ INR slightly high, but aPTT & fibrinogen normal  - LDH high, likely reactive due to acute infection   - HIT unlikely, no recent Heparin derived product exposure  - Folate, B12 and iron studies normal  - Lyme AB negative  - Monospot negative  - EBV serology IgM high  - 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

## 2023-07-05 NOTE — PROGRESS NOTE ADULT - SUBJECTIVE AND OBJECTIVE BOX
Date of service: 07-05-23 @ 11:04    Has LUQ abdominal pain  No fever  Has fatigue   Fever is down    ROS: no fever or chills; denies dizziness, no HA, no SOB or cough, no abdominal pain, no diarrhea or constipation; no dysuria, no legs pain, no rashes    MEDICATIONS  (STANDING):  atorvastatin 80 milliGRAM(s) Oral at bedtime  atovaquone  Suspension 750 milliGRAM(s) Oral every 12 hours  azithromycin  IVPB      azithromycin  IVPB 500 milliGRAM(s) IV Intermittent every 24 hours  metoprolol succinate ER 25 milliGRAM(s) Oral daily  raloxifene 60 milliGRAM(s) Oral daily  sodium chloride 0.9%. 1000 milliLiter(s) (75 mL/Hr) IV Continuous <Continuous>  traZODone 25 milliGRAM(s) Oral at bedtime    Vital Signs Last 24 Hrs  T(C): 36.4 (05 Jul 2023 08:30), Max: 36.7 (04 Jul 2023 20:55)  T(F): 97.5 (05 Jul 2023 08:30), Max: 98.1 (04 Jul 2023 20:55)  HR: 66 (05 Jul 2023 08:30) (66 - 84)  BP: 100/61 (05 Jul 2023 08:30) (100/61 - 117/66)  BP(mean): --  RR: 18 (05 Jul 2023 08:30) (18 - 18)  SpO2: 92% (05 Jul 2023 08:30) (83% - 99%)    Parameters below as of 05 Jul 2023 08:30  Patient On (Oxygen Delivery Method): nasal cannula  O2 Flow (L/min): 2       Physical exam:    Constitutional:  No acute distress  HEENT: NC/AT, EOMI, PERRLA, conjunctivae clear; ears and nose atraumatic  Neck: supple; thyroid not palpable  Back: no tenderness  Respiratory: respiratory effort normal; clear to auscultation  Cardiovascular: S1S2 regular, no murmurs  Abdomen: soft, not tender, not distended, positive BS; HSM  Genitourinary: no suprapubic tenderness  Lymphatic: no LN palpable  Musculoskeletal: no muscle tenderness, no joint swelling or tenderness  Extremities: no pedal edema  Neurological/ Psychiatric: AxOx3, judgement and insight normal; moving all extremities  Skin: no rashes; no palpable lesions    Labs: reviewed                        9.9    4.24  )-----------( 118      ( 04 Jul 2023 07:20 )             29.7     07-04    140  |  112<H>  |  10  ----------------------------<  121<H>  3.8   |  22  |  0.75    Ca    8.2<L>      04 Jul 2023 07:20    TPro  6.0  /  Alb  2.3<L>  /  TBili  0.7  /  DBili  x   /  AST  43<H>  /  ALT  52  /  AlkPhos  90  07-04    Ferritin: 678 ng/mL (07-03-23 @ 16:48)  C-Reactive Protein, Serum: 52 mg/L (07-03-23 @ 16:48)  D-Dimer Assay, Quantitative: 2392 ng/mL DDU (07-03-23 @ 08:06)                        10.0   3.87  )-----------( 100      ( 03 Jul 2023 06:56 )             31.1     07-03    140  |  114<H>  |  13  ----------------------------<  143<H>  3.8   |  23  |  0.87    Ca    8.3<L>      03 Jul 2023 06:56    TPro  6.0  /  Alb  2.5<L>  /  TBili  0.8  /  DBili  x   /  AST  42<H>  /  ALT  51  /  AlkPhos  91  07-03     LIVER FUNCTIONS - ( 03 Jul 2023 06:56 )  Alb: 2.5 g/dL / Pro: 6.0 gm/dL / ALK PHOS: 91 U/L / ALT: 51 U/L / AST: 42 U/L / GGT: x           Urinalysis Basic - ( 03 Jul 2023 06:56 )    Color: x / Appearance: x / SG: x / pH: x  Gluc: 143 mg/dL / Ketone: x  / Bili: x / Urobili: x   Blood: x / Protein: x / Nitrite: x   Leuk Esterase: x / RBC: x / WBC x   Sq Epi: x / Non Sq Epi: x / Bacteria: x    (06-30 @ 19:30)  NotDete    Culture - Blood (collected 30 Jun 2023 20:11)  Source: .Blood Blood/ Aerobic plus received  Preliminary Report (03 Jul 2023 02:02):    No growth at 48 Hours    Culture - Blood (collected 30 Jun 2023 20:11)  Source: .Blood Blood / Aerobic plus received  Preliminary Report (03 Jul 2023 02:02):    No growth at 48 Hours    Culture - Urine (collected 30 Jun 2023 19:30)  Source: Clean Catch Clean Catch (Midstream)  Final Report (02 Jul 2023 17:07):    <10,000 CFU/mL Normal Urogenital Sonal    Radiology: all available radiological tests reviewed    < from: CT Angio Chest PE Protocol w/ IV Cont (07.03.23 @ 10:36) >  1.  No pulmonary embolism.  2.  Trace left pleural effusion.    < end of copied text >  < from: CT Abdomen and Pelvis w/ IV Cont (06.30.23 @ 16:07) >  SPLEEN: Splenomegaly. Spleen measures 15 cm in length.  Scattered sigmoid diverticulosis without evidence of acute diverticulitis. Normal appendix.  Hepatosplenomegaly.  < end of copied text >      Advanced directives addressed: full resuscitation

## 2023-07-05 NOTE — PROGRESS NOTE ADULT - ASSESSMENT
6 y/o female with PMHx of HTN, HLD who presented to [place] ED c/o weakness, fatigue, dizziness, near-syncope.      #+Babesia   -Atovaquone and azithromycin    #+ EBV  -supportive care  -Monitor plts    #Thrombocytopenia  -Monitor plts - improving    # Presyncope  - Likely due to viral syndrome and/or dehydration  - Repeat orthostatics in am  - Echo reviewed 7/1 EF >65 %, Normal appearing right ventricle structure and function.    - SP Fluid bolus in ED  - Cardiology consult appreciated  - Stable on monitor    # Hypoxia  - Likely due to atelectasis  - CTA negative for PE   - Incentive spirometer    # HSM  - Monospot negative  - With thrombocytopenia  - Elevated ESR  - Check Tick born disease, +babesia  - Heme consult   - IV Tylenol for pain    Disposition: DC Tele, Transfer to [facility]

## 2023-07-05 NOTE — PROGRESS NOTE ADULT - ASSESSMENT
68 y/o female with h/o HTN, HLD was admitted on 7/3 for weakness, fatigue, dizziness, near-syncope.  Pt had a temperature of 99. F at home associated with chills, diaphoresis, headache for four days PTA. She reported left sided abdominal pain for nearly a month PTA associated with nausea, but no vomiting. In ER the patient was found dehydrated with mild hypoxia. SHe continued to have low grade fever. She received ceftriaxone.     1. Low grade fever. Babesiosis. Infectious mononucleosis. Thrombocytopenia resolving. Splenomegaly. Possible tick related illness. Possible viral syndrome.  -left abdominal pain is likely due to SM  -EBV serology IgM high  -cultures noted  -Lyme AB are negative  -babesia PCR is detected  -EBV, CMV, ESR, CRP, LDH noted  -s/p doxycycline 100 mg PO q12h # 2  -tolerating abx well so far; no side effects noted  -on atorvaquone 750 mg PO q12h and azithromycin 500 mg IV qd # 2  -f/u serologies  -obtain parasite count  -continue abx coverage   -monitor temps  -f/u CBC  -supportive care  2. Other issues:   -care per medicine

## 2023-07-06 ENCOUNTER — TRANSCRIPTION ENCOUNTER (OUTPATIENT)
Age: 68
End: 2023-07-06

## 2023-07-06 VITALS
OXYGEN SATURATION: 91 % | TEMPERATURE: 97 F | HEART RATE: 77 BPM | DIASTOLIC BLOOD PRESSURE: 74 MMHG | RESPIRATION RATE: 18 BRPM | SYSTOLIC BLOOD PRESSURE: 114 MMHG

## 2023-07-06 LAB
A PHAGOCYTOPH DNA BLD QL NAA+PROBE: NEGATIVE — SIGNIFICANT CHANGE UP
A PHAGOCYTOPH IGG TITR SER IF: SIGNIFICANT CHANGE UP TITER
ALBUMIN SERPL ELPH-MCNC: 2.6 G/DL — LOW (ref 3.3–5)
ALP SERPL-CCNC: 103 U/L — SIGNIFICANT CHANGE UP (ref 40–120)
ALT FLD-CCNC: 75 U/L — SIGNIFICANT CHANGE UP (ref 12–78)
ANION GAP SERPL CALC-SCNC: 1 MMOL/L — LOW (ref 5–17)
AST SERPL-CCNC: 49 U/L — HIGH (ref 15–37)
B BURGDOR AB SER QL IA: SIGNIFICANT CHANGE UP
B MICROTI IGG TITR SER: ABNORMAL TITER
BASOPHILS # BLD AUTO: 0.03 K/UL — SIGNIFICANT CHANGE UP (ref 0–0.2)
BASOPHILS NFR BLD AUTO: 0.7 % — SIGNIFICANT CHANGE UP (ref 0–2)
BILIRUB SERPL-MCNC: 0.6 MG/DL — SIGNIFICANT CHANGE UP (ref 0.2–1.2)
BUN SERPL-MCNC: 9 MG/DL — SIGNIFICANT CHANGE UP (ref 7–23)
CALCIUM SERPL-MCNC: 8.7 MG/DL — SIGNIFICANT CHANGE UP (ref 8.5–10.1)
CHLORIDE SERPL-SCNC: 113 MMOL/L — HIGH (ref 96–108)
CO2 SERPL-SCNC: 27 MMOL/L — SIGNIFICANT CHANGE UP (ref 22–31)
CREAT SERPL-MCNC: 0.75 MG/DL — SIGNIFICANT CHANGE UP (ref 0.5–1.3)
CULTURE RESULTS: SIGNIFICANT CHANGE UP
CULTURE RESULTS: SIGNIFICANT CHANGE UP
E CHAFFEENSIS DNA BLD QL NAA+PROBE: NEGATIVE — SIGNIFICANT CHANGE UP
E CHAFFEENSIS IGG TITR SER IF: ABNORMAL TITER
E EWINGII DNA SPEC QL NAA+PROBE: NEGATIVE — SIGNIFICANT CHANGE UP
EGFR: 87 ML/MIN/1.73M2 — SIGNIFICANT CHANGE UP
EHRLICHIA DNA SPEC QL NAA+PROBE: NEGATIVE — SIGNIFICANT CHANGE UP
EOSINOPHIL # BLD AUTO: 0.07 K/UL — SIGNIFICANT CHANGE UP (ref 0–0.5)
EOSINOPHIL NFR BLD AUTO: 1.6 % — SIGNIFICANT CHANGE UP (ref 0–6)
GLUCOSE SERPL-MCNC: 102 MG/DL — HIGH (ref 70–99)
HCT VFR BLD CALC: 31.9 % — LOW (ref 34.5–45)
HGB BLD-MCNC: 10.4 G/DL — LOW (ref 11.5–15.5)
IMM GRANULOCYTES NFR BLD AUTO: 0.5 % — SIGNIFICANT CHANGE UP (ref 0–0.9)
LYMPHOCYTES # BLD AUTO: 1.81 K/UL — SIGNIFICANT CHANGE UP (ref 1–3.3)
LYMPHOCYTES # BLD AUTO: 41.3 % — SIGNIFICANT CHANGE UP (ref 13–44)
MCHC RBC-ENTMCNC: 29.3 PG — SIGNIFICANT CHANGE UP (ref 27–34)
MCHC RBC-ENTMCNC: 32.6 GM/DL — SIGNIFICANT CHANGE UP (ref 32–36)
MCV RBC AUTO: 89.9 FL — SIGNIFICANT CHANGE UP (ref 80–100)
MONOCYTES # BLD AUTO: 0.58 K/UL — SIGNIFICANT CHANGE UP (ref 0–0.9)
MONOCYTES NFR BLD AUTO: 13.2 % — SIGNIFICANT CHANGE UP (ref 2–14)
NEUTROPHILS # BLD AUTO: 1.87 K/UL — SIGNIFICANT CHANGE UP (ref 1.8–7.4)
NEUTROPHILS NFR BLD AUTO: 42.7 % — LOW (ref 43–77)
PLATELET # BLD AUTO: 228 K/UL — SIGNIFICANT CHANGE UP (ref 150–400)
POTASSIUM SERPL-MCNC: 4 MMOL/L — SIGNIFICANT CHANGE UP (ref 3.5–5.3)
POTASSIUM SERPL-SCNC: 4 MMOL/L — SIGNIFICANT CHANGE UP (ref 3.5–5.3)
PROT SERPL-MCNC: 6.5 GM/DL — SIGNIFICANT CHANGE UP (ref 6–8.3)
RBC # BLD: 3.55 M/UL — LOW (ref 3.8–5.2)
RBC # FLD: 15.3 % — HIGH (ref 10.3–14.5)
SODIUM SERPL-SCNC: 141 MMOL/L — SIGNIFICANT CHANGE UP (ref 135–145)
SPECIMEN SOURCE: SIGNIFICANT CHANGE UP
SPECIMEN SOURCE: SIGNIFICANT CHANGE UP
WBC # BLD: 4.38 K/UL — SIGNIFICANT CHANGE UP (ref 3.8–10.5)
WBC # FLD AUTO: 4.38 K/UL — SIGNIFICANT CHANGE UP (ref 3.8–10.5)

## 2023-07-06 PROCEDURE — 99238 HOSP IP/OBS DSCHRG MGMT 30/<: CPT

## 2023-07-06 RX ORDER — AZITHROMYCIN 500 MG/1
1 TABLET, FILM COATED ORAL
Qty: 10 | Refills: 0
Start: 2023-07-06 | End: 2023-07-15

## 2023-07-06 RX ORDER — AZITHROMYCIN 500 MG/1
500 TABLET, FILM COATED ORAL DAILY
Refills: 0 | Status: DISCONTINUED | OUTPATIENT
Start: 2023-07-07 | End: 2023-07-06

## 2023-07-06 RX ORDER — ATOVAQUONE 750 MG/5ML
5 SUSPENSION ORAL
Qty: 100 | Refills: 0
Start: 2023-07-06 | End: 2023-07-15

## 2023-07-06 RX ADMIN — AZITHROMYCIN 255 MILLIGRAM(S): 500 TABLET, FILM COATED ORAL at 10:09

## 2023-07-06 RX ADMIN — Medication 650 MILLIGRAM(S): at 10:31

## 2023-07-06 RX ADMIN — Medication 25 MILLIGRAM(S): at 10:09

## 2023-07-06 RX ADMIN — RALOXIFENE HYDROCHLORIDE 60 MILLIGRAM(S): 60 TABLET, COATED ORAL at 10:10

## 2023-07-06 RX ADMIN — Medication 650 MILLIGRAM(S): at 00:06

## 2023-07-06 RX ADMIN — ATOVAQUONE 750 MILLIGRAM(S): 750 SUSPENSION ORAL at 10:07

## 2023-07-06 RX ADMIN — SODIUM CHLORIDE 75 MILLILITER(S): 9 INJECTION INTRAMUSCULAR; INTRAVENOUS; SUBCUTANEOUS at 00:06

## 2023-07-06 NOTE — PROGRESS NOTE ADULT - ASSESSMENT
66 y/o female with PMHx of HTN, HLD who presented to [place] ED c/o weakness, fatigue, dizziness, near-syncope.      #+Babesia   -Discharge home  -Atovaquone and azithromycin for 10 more days  -Check Parisitemia level in 1 week    #+ EBV  -supportive care  -Monitor plts    #Thrombocytopenia  -Monitor plts - improving    # Presyncope  - Likely due to viral syndrome and/or dehydration  - Repeat orthostatics in am  - Echo reviewed 7/1 EF >65 %, Normal appearing right ventricle structure and function.    - SP Fluid bolus in ED  - Cardiology consult appreciated  - Stable on monitor    # Hypoxia  - Likely due to atelectasis  - CTA negative for PE   - Incentive spirometer    # HSM  - Monospot negative  - With thrombocytopenia  - Elevated ESR  - Check Tick born disease, +babesia  - Heme consult   - IV Tylenol for pain    Disposition: DC home

## 2023-07-06 NOTE — DISCHARGE NOTE PROVIDER - NSDCCPCAREPLAN_GEN_ALL_CORE_FT
PRINCIPAL DISCHARGE DIAGNOSIS  Diagnosis: Near syncope  Assessment and Plan of Treatment: Diagnosed with Babesia and EBV. Follow up with Paristemia level in one week with PMD      SECONDARY DISCHARGE DIAGNOSES  Diagnosis: Other ventricular tachycardia  Assessment and Plan of Treatment:

## 2023-07-06 NOTE — DISCHARGE NOTE PROVIDER - HOSPITAL COURSE
68 y/o female with PMHx of HTN, HLD who presented to [place] ED c/o weakness, fatigue, dizziness, near-syncope diagnoseed with Babesia and started on Atovaquone and azithromycin and also diagnosed with +EBV with spleenomegaly, leukopenia with thrombocytopenia. Leukopenia and thrombocytopenia resolved. Discharge home on 10 more days of Azithromycin and Atovaquone. Parasitemia level less than 1%. Need to repeat parasitemia level in 7 days.     < from: US Abdomen Complete (US Abdomen Complete .) (07.03.23 @ 20:06) >    IMPRESSION:  No acute findings. No hydronephrosis or shadowing renal calculi in either   kidney.    Hepatosplenomegaly.    < end of copied text >    < from: CT Angio Chest PE Protocol w/ IV Cont (07.03.23 @ 10:36) >    IMPRESSION:    1.  No pulmonary embolism.  2.  Trace left pleural effusion.    < end of copied text >

## 2023-07-06 NOTE — PROGRESS NOTE ADULT - ASSESSMENT
66 y/o female with h/o HTN, HLD was admitted on 7/3 for weakness, fatigue, dizziness, near-syncope.  Pt had a temperature of 99. F at home associated with chills, diaphoresis, headache for four days PTA. She reported left sided abdominal pain for nearly a month PTA associated with nausea, but no vomiting. In ER the patient was found dehydrated with mild hypoxia. SHe continued to have low grade fever. She received ceftriaxone.     1. Low grade fever. Babesiosis. Infectious mononucleosis. Thrombocytopenia resolving. Splenomegaly. Possible tick related illness. Possible viral syndrome.  -left abdominal pain is likely due to SM  -EBV serology IgM high  -cultures noted  -Lyme AB are negative  -babesia PCR is detected  -EBV, CMV, ESR, CRP, LDH noted  -s/p doxycycline 100 mg PO q12h # 2  -tolerating abx well so far; no side effects noted  -on atorvaquone 750 mg PO q12h and azithromycin 500 mg IV qd # 3  -f/u serologies  -parasite count is low  -may change azithromycin to PO formulation  -continue abx coverage for 10 more days  -f/u parasite count in approx 7 days  -monitor temps  -f/u CBC  -supportive care  2. Other issues:   -care per medicine

## 2023-07-06 NOTE — PROGRESS NOTE ADULT - SUBJECTIVE AND OBJECTIVE BOX
HOSPITALIST ATTENDING PROGRESS NOTE    Chart and meds reviewed.  Patient seen and examined.    CC/ HPI Patient is a 67y old  Female who presents with a chief complaint of Pre-syncope   Vtach  5 beat on ED telemetry  Abd pain (06 Jul 2023 16:41)      Subjective: Patient states she feels some improvement in strength. Is agreeable to being discharged and eager to go home as her daughter is coming from [place] to assist her for the next month and her anniversary is tomorrow.     All other systems reviewed and found to be negative with the exception of what has been described above.    MEDICATIONS:  MEDICATIONS  (STANDING):  atorvastatin 80 milliGRAM(s) Oral at bedtime  atovaquone  Suspension 750 milliGRAM(s) Oral every 12 hours  metoprolol succinate ER 25 milliGRAM(s) Oral daily  raloxifene 60 milliGRAM(s) Oral daily  sodium chloride 0.9%. 1000 milliLiter(s) (75 mL/Hr) IV Continuous <Continuous>  traZODone 25 milliGRAM(s) Oral at bedtime      Vital Signs Last 24 Hrs  T(C): 36.3 (06 Jul 2023 15:45), Max: 36.8 (05 Jul 2023 23:24)  T(F): 97.4 (06 Jul 2023 15:45), Max: 98.2 (05 Jul 2023 23:24)  HR: 77 (06 Jul 2023 15:45) (75 - 79)  BP: 114/74 (06 Jul 2023 15:45) (114/74 - 127/79)  BP(mean): 87 (06 Jul 2023 15:45) (87 - 90)  RR: 18 (06 Jul 2023 15:45) (18 - 18)  SpO2: 91% (06 Jul 2023 15:45) (91% - 94%)    Parameters below as of 06 Jul 2023 12:03  Patient On (Oxygen Delivery Method): room air        I&O's Summary      CAPILLARY BLOOD GLUCOSE          PHYSICAL EXAM:    Constitutional: NAD, awake and alert, well-developed  HEENT: PERR, EOMI, Normal Hearing, MMM  Neck: Soft and supple, No LAD, No JVD  Respiratory: Breath sounds are clear bilaterally, No wheezing, rales or rhonchi  Cardiovascular: S1 and S2, regular rate and rhythm, no Murmurs, gallops or rubs  Gastrointestinal: Bowel Sounds present, soft, nontender, nondistended, no guarding, no rebound  Extremities: No peripheral edema  Vascular: 2+ peripheral pulses  Neurological: A/O x 3, no focal deficits  Musculoskeletal: 5/5 strength b/l upper and lower extremities  Skin: No rashes        LABS: All Labs Reviewed:                        10.4   4.38  )-----------( 228      ( 06 Jul 2023 07:12 )             31.9     07-06    141  |  113<H>  |  9   ----------------------------<  102<H>  4.0   |  27  |  0.75    Ca    8.7      06 Jul 2023 07:12    TPro  6.5  /  Alb  2.6<L>  /  TBili  0.6  /  DBili  x   /  AST  49<H>  /  ALT  75  /  AlkPhos  103  07-06          Blood Culture: 07-05 @ 07:53  Organism --  Gram Stain Blood -- Gram Stain --  Specimen Source .Blood None  Culture-Blood --        RADIOLOGY/EKG:    DVT PPX:    ADVANCED DIRECTIVE:    DISPOSITION:

## 2023-07-06 NOTE — DISCHARGE NOTE PROVIDER - NSDCFUADDINST_GEN_ALL_CORE_FT
Avoid contact sports or activities with unexpected trauma to abdomen as you are at risk for spleenic rupture due to spleenomegaly

## 2023-07-06 NOTE — DISCHARGE NOTE PROVIDER - NSDCMRMEDTOKEN_GEN_ALL_CORE_FT
atorvastatin 80 mg oral tablet: 1 tab(s) orally once a day  atovaquone 750 mg/5 mL oral suspension: 5 milliliter(s) orally every 12 hours  azithromycin 500 mg oral tablet: 1 tab(s) orally once a day  ezetimibe 10 mg oral tablet: 1 tab(s) orally once a day  metoprolol succinate 50 mg oral tablet, extended release: 1 tab(s) orally once a day  raloxifene 60 mg oral tablet: 1 tab(s) orally once a day  traZODone 50 mg oral tablet: 0.5 tab(s) orally once a day (at bedtime)

## 2023-07-06 NOTE — DISCHARGE NOTE NURSING/CASE MANAGEMENT/SOCIAL WORK - PATIENT PORTAL LINK FT
You can access the FollowMyHealth Patient Portal offered by [facility] by registering at the following website: [email or web address]. By joining [facility] [employer]’s FollowMyHealth portal, you will also be able to view your health information using other applications (apps) compatible with our system.

## 2023-07-06 NOTE — PROGRESS NOTE ADULT - REASON FOR ADMISSION
Pre-syncope   Vtach  5 beat on ED telemetry  Abd pain

## 2023-07-06 NOTE — DISCHARGE NOTE PROVIDER - CARE PROVIDER_API CALL
Casey Starks  Internal Medicine  [address], [address]  [place] [address]  Phone: [phone]  Fax: [phone]  Established Patient  Follow Up Time: 1 week

## 2023-07-06 NOTE — PROGRESS NOTE ADULT - SUBJECTIVE AND OBJECTIVE BOX
Date of service: 07-06-23 @ 11:57    Lying in bed in NAD  Fever is down  Gets SOB with light exercise  No cough    ROS: no fever or chills; denies dizziness, no HA, no abdominal pain, no diarrhea or constipation; no dysuria, no legs pain, no rashes    MEDICATIONS  (STANDING):  atorvastatin 80 milliGRAM(s) Oral at bedtime  atovaquone  Suspension 750 milliGRAM(s) Oral every 12 hours  metoprolol succinate ER 25 milliGRAM(s) Oral daily  raloxifene 60 milliGRAM(s) Oral daily  sodium chloride 0.9%. 1000 milliLiter(s) (75 mL/Hr) IV Continuous <Continuous>  traZODone 25 milliGRAM(s) Oral at bedtime    Vital Signs Last 24 Hrs  T(C): 36.8 (06 Jul 2023 08:55), Max: 36.8 (05 Jul 2023 23:24)  T(F): 98.2 (06 Jul 2023 08:55), Max: 98.2 (05 Jul 2023 23:24)  HR: 75 (06 Jul 2023 08:55) (70 - 79)  BP: 127/79 (06 Jul 2023 08:55) (101/68 - 127/79)  BP(mean): 90 (05 Jul 2023 23:24) (79 - 90)  RR: 18 (06 Jul 2023 08:55) (18 - 18)  SpO2: 94% (06 Jul 2023 08:55) (94% - 96%)    Parameters below as of 06 Jul 2023 08:55  Patient On (Oxygen Delivery Method): nasal cannula  O2 Flow (L/min): 2     Physical exam:    Constitutional:  No acute distress  HEENT: NC/AT, EOMI, PERRLA, conjunctivae clear; ears and nose atraumatic  Neck: supple; thyroid not palpable  Back: no tenderness  Respiratory: respiratory effort normal; clear to auscultation  Cardiovascular: S1S2 regular, no murmurs  Abdomen: soft, not tender, not distended, positive BS; HSM  Genitourinary: no suprapubic tenderness  Lymphatic: no LN palpable  Musculoskeletal: no muscle tenderness, no joint swelling or tenderness  Extremities: no pedal edema  Neurological/ Psychiatric: AxOx3, judgement and insight normal; moving all extremities  Skin: no rashes; no palpable lesions    Labs: reviewed                        10.4   4.38  )-----------( 228      ( 06 Jul 2023 07:12 )             31.9     07-06    141  |  113<H>  |  9   ----------------------------<  102<H>  4.0   |  27  |  0.75    Ca    8.7      06 Jul 2023 07:12    TPro  6.5  /  Alb  2.6<L>  /  TBili  0.6  /  DBili  x   /  AST  49<H>  /  ALT  75  /  AlkPhos  103  07-06  parasite level <1%  Ferritin: 678 ng/mL (07-03-23 @ 16:48)  C-Reactive Protein, Serum: 52 mg/L (07-03-23 @ 16:48)  D-Dimer Assay, Quantitative: 2392 ng/mL DDU (07-03-23 @ 08:06)                        9.9    4.24  )-----------( 118      ( 04 Jul 2023 07:20 )             29.7     07-04    140  |  112<H>  |  10  ----------------------------<  121<H>  3.8   |  22  |  0.75    Ca    8.2<L>      04 Jul 2023 07:20    TPro  6.0  /  Alb  2.3<L>  /  TBili  0.7  /  DBili  x   /  AST  43<H>  /  ALT  52  /  AlkPhos  90  07-04    Ferritin: 678 ng/mL (07-03-23 @ 16:48)  C-Reactive Protein, Serum: 52 mg/L (07-03-23 @ 16:48)  D-Dimer Assay, Quantitative: 2392 ng/mL DDU (07-03-23 @ 08:06)                        10.0   3.87  )-----------( 100      ( 03 Jul 2023 06:56 )             31.1     07-03    140  |  114<H>  |  13  ----------------------------<  143<H>  3.8   |  23  |  0.87    Ca    8.3<L>      03 Jul 2023 06:56    TPro  6.0  /  Alb  2.5<L>  /  TBili  0.8  /  DBili  x   /  AST  42<H>  /  ALT  51  /  AlkPhos  91  07-03     LIVER FUNCTIONS - ( 03 Jul 2023 06:56 )  Alb: 2.5 g/dL / Pro: 6.0 gm/dL / ALK PHOS: 91 U/L / ALT: 51 U/L / AST: 42 U/L / GGT: x           Urinalysis Basic - ( 03 Jul 2023 06:56 )    Color: x / Appearance: x / SG: x / pH: x  Gluc: 143 mg/dL / Ketone: x  / Bili: x / Urobili: x   Blood: x / Protein: x / Nitrite: x   Leuk Esterase: x / RBC: x / WBC x   Sq Epi: x / Non Sq Epi: x / Bacteria: x    (06-30 @ 19:30)  NotDetec    Culture - Blood (collected 30 Jun 2023 20:11)  Source: .Blood Blood/ Aerobic plus received  Preliminary Report (03 Jul 2023 02:02):    No growth at 48 Hours    Culture - Blood (collected 30 Jun 2023 20:11)  Source: .Blood Blood / Aerobic plus received  Preliminary Report (03 Jul 2023 02:02):    No growth at 48 Hours    Culture - Urine (collected 30 Jun 2023 19:30)  Source: Clean Catch Clean Catch (Midstream)  Final Report (02 Jul 2023 17:07):    <10,000 CFU/mL Normal Urogenital Sonal    Radiology: all available radiological tests reviewed    < from: CT Angio Chest PE Protocol w/ IV Cont (07.03.23 @ 10:36) >  1.  No pulmonary embolism.  2.  Trace left pleural effusion.    < end of copied text >  < from: CT Abdomen and Pelvis w/ IV Cont (06.30.23 @ 16:07) >  SPLEEN: Splenomegaly. Spleen measures 15 cm in length.  Scattered sigmoid diverticulosis without evidence of acute diverticulitis. Normal appendix.  Hepatosplenomegaly.  < end of copied text >      Advanced directives addressed: full resuscitation

## 2023-07-06 NOTE — PROGRESS NOTE ADULT - PROVIDER SPECIALTY LIST ADULT
Hospitalist
Infectious Disease
Cardiology
Hospitalist
Heme/Onc
Infectious Disease
Heme/Onc
Hospitalist
Infectious Disease

## 2023-07-07 LAB
R RICKETTSI AB SER-ACNC: NEGATIVE — SIGNIFICANT CHANGE UP
R RICKETTSI IGM SER-ACNC: 0.35 INDEX — SIGNIFICANT CHANGE UP (ref 0–0.89)
RICK SF IGG TITR SER IF: NEGATIVE — SIGNIFICANT CHANGE UP
RICK SF IGM TITR SER IF: 0.35 INDEX — SIGNIFICANT CHANGE UP (ref 0–0.89)
WNV IGG TITR FLD: NEGATIVE — SIGNIFICANT CHANGE UP
WNV IGM SPEC QL: NEGATIVE — SIGNIFICANT CHANGE UP

## 2023-07-11 DIAGNOSIS — Z88.1 ALLERGY STATUS TO OTHER ANTIBIOTIC AGENTS STATUS: ICD-10-CM

## 2023-07-11 DIAGNOSIS — E86.0 DEHYDRATION: ICD-10-CM

## 2023-07-11 DIAGNOSIS — B60.00 BABESIOSIS, UNSPECIFIED: ICD-10-CM

## 2023-07-11 DIAGNOSIS — Z87.891 PERSONAL HISTORY OF NICOTINE DEPENDENCE: ICD-10-CM

## 2023-07-11 DIAGNOSIS — R16.2 HEPATOMEGALY WITH SPLENOMEGALY, NOT ELSEWHERE CLASSIFIED: ICD-10-CM

## 2023-07-11 DIAGNOSIS — E78.5 HYPERLIPIDEMIA, UNSPECIFIED: ICD-10-CM

## 2023-07-11 DIAGNOSIS — B27.00 GAMMAHERPESVIRAL MONONUCLEOSIS WITHOUT COMPLICATION: ICD-10-CM

## 2023-07-11 DIAGNOSIS — I47.29 OTHER VENTRICULAR TACHYCARDIA: ICD-10-CM

## 2023-07-11 DIAGNOSIS — D69.6 THROMBOCYTOPENIA, UNSPECIFIED: ICD-10-CM

## 2023-07-11 DIAGNOSIS — E66.9 OBESITY, UNSPECIFIED: ICD-10-CM

## 2023-07-11 DIAGNOSIS — J98.11 ATELECTASIS: ICD-10-CM

## 2023-07-11 DIAGNOSIS — Z20.822 CONTACT WITH AND (SUSPECTED) EXPOSURE TO COVID-19: ICD-10-CM

## 2023-07-11 DIAGNOSIS — I10 ESSENTIAL (PRIMARY) HYPERTENSION: ICD-10-CM

## 2023-07-11 DIAGNOSIS — K59.00 CONSTIPATION, UNSPECIFIED: ICD-10-CM

## 2023-08-08 LAB — ADD ON TEST-SPECIMEN IN LAB: SIGNIFICANT CHANGE UP

## 2023-09-08 ENCOUNTER — TRANSCRIPTION ENCOUNTER (OUTPATIENT)
Age: 68
End: 2023-09-08

## 2024-04-12 NOTE — DISCHARGE NOTE NURSING/CASE MANAGEMENT/SOCIAL WORK - NSTOBACCONEVERSMOKERY/N_GEN_A
Report given to Rashi RN. All questions and concerns were addressed. Once central line is placed patient will be transferred to the ICU   Yes